# Patient Record
Sex: FEMALE | Race: OTHER | NOT HISPANIC OR LATINO | URBAN - METROPOLITAN AREA
[De-identification: names, ages, dates, MRNs, and addresses within clinical notes are randomized per-mention and may not be internally consistent; named-entity substitution may affect disease eponyms.]

---

## 2021-07-03 ENCOUNTER — EMERGENCY (EMERGENCY)
Facility: HOSPITAL | Age: 26
LOS: 1 days | Discharge: HOME | End: 2021-07-03
Attending: EMERGENCY MEDICINE
Payer: MEDICAID

## 2021-07-03 VITALS
OXYGEN SATURATION: 99 % | SYSTOLIC BLOOD PRESSURE: 108 MMHG | TEMPERATURE: 98 F | RESPIRATION RATE: 18 BRPM | HEART RATE: 94 BPM | DIASTOLIC BLOOD PRESSURE: 72 MMHG

## 2021-07-03 VITALS
RESPIRATION RATE: 20 BRPM | DIASTOLIC BLOOD PRESSURE: 87 MMHG | SYSTOLIC BLOOD PRESSURE: 149 MMHG | TEMPERATURE: 98 F | HEIGHT: 62 IN | HEART RATE: 118 BPM | OXYGEN SATURATION: 97 % | WEIGHT: 268.08 LBS

## 2021-07-03 LAB
ALBUMIN SERPL ELPH-MCNC: 3.3 G/DL — LOW (ref 3.5–5)
ALP SERPL-CCNC: 80 U/L — SIGNIFICANT CHANGE UP (ref 40–120)
ALT FLD-CCNC: 34 U/L DA — SIGNIFICANT CHANGE UP (ref 10–60)
ANION GAP SERPL CALC-SCNC: 9 MMOL/L — SIGNIFICANT CHANGE UP (ref 5–17)
APPEARANCE UR: CLEAR — SIGNIFICANT CHANGE UP
APTT BLD: 32.8 SEC — SIGNIFICANT CHANGE UP (ref 27.5–35.5)
AST SERPL-CCNC: 26 U/L — SIGNIFICANT CHANGE UP (ref 10–40)
BACTERIA # UR AUTO: ABNORMAL /HPF
BASOPHILS # BLD AUTO: 0.05 K/UL — SIGNIFICANT CHANGE UP (ref 0–0.2)
BASOPHILS NFR BLD AUTO: 0.5 % — SIGNIFICANT CHANGE UP (ref 0–2)
BILIRUB SERPL-MCNC: 0.3 MG/DL — SIGNIFICANT CHANGE UP (ref 0.2–1.2)
BILIRUB UR-MCNC: NEGATIVE — SIGNIFICANT CHANGE UP
BLD GP AB SCN SERPL QL: SIGNIFICANT CHANGE UP
BUN SERPL-MCNC: 8 MG/DL — SIGNIFICANT CHANGE UP (ref 7–18)
CALCIUM SERPL-MCNC: 8.3 MG/DL — LOW (ref 8.4–10.5)
CHLORIDE SERPL-SCNC: 106 MMOL/L — SIGNIFICANT CHANGE UP (ref 96–108)
CO2 SERPL-SCNC: 24 MMOL/L — SIGNIFICANT CHANGE UP (ref 22–31)
COLOR SPEC: YELLOW — SIGNIFICANT CHANGE UP
COMMENT - URINE: SIGNIFICANT CHANGE UP
CREAT SERPL-MCNC: 0.47 MG/DL — LOW (ref 0.5–1.3)
DIFF PNL FLD: ABNORMAL
EOSINOPHIL # BLD AUTO: 0.17 K/UL — SIGNIFICANT CHANGE UP (ref 0–0.5)
EOSINOPHIL NFR BLD AUTO: 1.8 % — SIGNIFICANT CHANGE UP (ref 0–6)
EPI CELLS # UR: ABNORMAL /HPF
GLUCOSE SERPL-MCNC: 98 MG/DL — SIGNIFICANT CHANGE UP (ref 70–99)
GLUCOSE UR QL: NEGATIVE — SIGNIFICANT CHANGE UP
HCG SERPL-ACNC: 4613 MIU/ML — HIGH
HCT VFR BLD CALC: 37.2 % — SIGNIFICANT CHANGE UP (ref 34.5–45)
HGB BLD-MCNC: 12.3 G/DL — SIGNIFICANT CHANGE UP (ref 11.5–15.5)
IMM GRANULOCYTES NFR BLD AUTO: 0.4 % — SIGNIFICANT CHANGE UP (ref 0–1.5)
INR BLD: 1.02 RATIO — SIGNIFICANT CHANGE UP (ref 0.88–1.16)
KETONES UR-MCNC: NEGATIVE — SIGNIFICANT CHANGE UP
LEUKOCYTE ESTERASE UR-ACNC: NEGATIVE — SIGNIFICANT CHANGE UP
LYMPHOCYTES # BLD AUTO: 2.91 K/UL — SIGNIFICANT CHANGE UP (ref 1–3.3)
LYMPHOCYTES # BLD AUTO: 31.4 % — SIGNIFICANT CHANGE UP (ref 13–44)
MCHC RBC-ENTMCNC: 29.4 PG — SIGNIFICANT CHANGE UP (ref 27–34)
MCHC RBC-ENTMCNC: 33.1 GM/DL — SIGNIFICANT CHANGE UP (ref 32–36)
MCV RBC AUTO: 89 FL — SIGNIFICANT CHANGE UP (ref 80–100)
MONOCYTES # BLD AUTO: 1.03 K/UL — HIGH (ref 0–0.9)
MONOCYTES NFR BLD AUTO: 11.1 % — SIGNIFICANT CHANGE UP (ref 2–14)
NEUTROPHILS # BLD AUTO: 5.08 K/UL — SIGNIFICANT CHANGE UP (ref 1.8–7.4)
NEUTROPHILS NFR BLD AUTO: 54.8 % — SIGNIFICANT CHANGE UP (ref 43–77)
NITRITE UR-MCNC: NEGATIVE — SIGNIFICANT CHANGE UP
NRBC # BLD: 0 /100 WBCS — SIGNIFICANT CHANGE UP (ref 0–0)
PH UR: 5 — SIGNIFICANT CHANGE UP (ref 5–8)
PLATELET # BLD AUTO: 340 K/UL — SIGNIFICANT CHANGE UP (ref 150–400)
POTASSIUM SERPL-MCNC: 4.6 MMOL/L — SIGNIFICANT CHANGE UP (ref 3.5–5.3)
POTASSIUM SERPL-SCNC: 4.6 MMOL/L — SIGNIFICANT CHANGE UP (ref 3.5–5.3)
PROT SERPL-MCNC: 7.3 G/DL — SIGNIFICANT CHANGE UP (ref 6–8.3)
PROT UR-MCNC: 15
PROTHROM AB SERPL-ACNC: 12.1 SEC — SIGNIFICANT CHANGE UP (ref 10.6–13.6)
RBC # BLD: 4.18 M/UL — SIGNIFICANT CHANGE UP (ref 3.8–5.2)
RBC # FLD: 12.7 % — SIGNIFICANT CHANGE UP (ref 10.3–14.5)
RBC CASTS # UR COMP ASSIST: ABNORMAL /HPF (ref 0–2)
SODIUM SERPL-SCNC: 139 MMOL/L — SIGNIFICANT CHANGE UP (ref 135–145)
SP GR SPEC: 1.02 — SIGNIFICANT CHANGE UP (ref 1.01–1.02)
UROBILINOGEN FLD QL: NEGATIVE — SIGNIFICANT CHANGE UP
WBC # BLD: 9.28 K/UL — SIGNIFICANT CHANGE UP (ref 3.8–10.5)
WBC # FLD AUTO: 9.28 K/UL — SIGNIFICANT CHANGE UP (ref 3.8–10.5)
WBC UR QL: SIGNIFICANT CHANGE UP /HPF (ref 0–5)

## 2021-07-03 PROCEDURE — 86900 BLOOD TYPING SEROLOGIC ABO: CPT

## 2021-07-03 PROCEDURE — 76802 OB US < 14 WKS ADDL FETUS: CPT

## 2021-07-03 PROCEDURE — 86850 RBC ANTIBODY SCREEN: CPT

## 2021-07-03 PROCEDURE — 84702 CHORIONIC GONADOTROPIN TEST: CPT

## 2021-07-03 PROCEDURE — 85025 COMPLETE CBC W/AUTO DIFF WBC: CPT

## 2021-07-03 PROCEDURE — 85610 PROTHROMBIN TIME: CPT

## 2021-07-03 PROCEDURE — 86901 BLOOD TYPING SEROLOGIC RH(D): CPT

## 2021-07-03 PROCEDURE — 99285 EMERGENCY DEPT VISIT HI MDM: CPT

## 2021-07-03 PROCEDURE — 81001 URINALYSIS AUTO W/SCOPE: CPT

## 2021-07-03 PROCEDURE — 76815 OB US LIMITED FETUS(S): CPT | Mod: 26

## 2021-07-03 PROCEDURE — 80053 COMPREHEN METABOLIC PANEL: CPT

## 2021-07-03 PROCEDURE — 76817 TRANSVAGINAL US OBSTETRIC: CPT | Mod: 26

## 2021-07-03 PROCEDURE — 85730 THROMBOPLASTIN TIME PARTIAL: CPT

## 2021-07-03 PROCEDURE — 36415 COLL VENOUS BLD VENIPUNCTURE: CPT

## 2021-07-03 PROCEDURE — 76817 TRANSVAGINAL US OBSTETRIC: CPT

## 2021-07-03 PROCEDURE — 96372 THER/PROPH/DIAG INJ SC/IM: CPT

## 2021-07-03 PROCEDURE — 99284 EMERGENCY DEPT VISIT MOD MDM: CPT | Mod: 25

## 2021-07-03 RX ORDER — SODIUM CHLORIDE 9 MG/ML
3 INJECTION INTRAMUSCULAR; INTRAVENOUS; SUBCUTANEOUS EVERY 8 HOURS
Refills: 0 | Status: DISCONTINUED | OUTPATIENT
Start: 2021-07-03 | End: 2021-07-06

## 2021-07-03 RX ORDER — SODIUM CHLORIDE 9 MG/ML
1000 INJECTION INTRAMUSCULAR; INTRAVENOUS; SUBCUTANEOUS ONCE
Refills: 0 | Status: COMPLETED | OUTPATIENT
Start: 2021-07-03 | End: 2021-07-03

## 2021-07-03 RX ADMIN — SODIUM CHLORIDE 1000 MILLILITER(S): 9 INJECTION INTRAMUSCULAR; INTRAVENOUS; SUBCUTANEOUS at 05:56

## 2021-07-03 RX ADMIN — SODIUM CHLORIDE 3 MILLILITER(S): 9 INJECTION INTRAMUSCULAR; INTRAVENOUS; SUBCUTANEOUS at 05:56

## 2021-07-03 NOTE — ED ADULT NURSE NOTE - NS ED NURSE DC INFO COMPLEXITY
Other Countries/No
Simple: Patient demonstrates quick and easy understanding/Verbalized Understanding

## 2021-07-03 NOTE — ED PROVIDER NOTE - CLINICAL SUMMARY MEDICAL DECISION MAKING FREE TEXT BOX
Pt p/w vaginal spotting at 5 weeks GA by LMP with no other associated symptoms. Tachycardia and otw unremarkable exam. Pt prefers U/S to pelvic exam to assess for spontaneous  vs ectopic. Giving fluids, pt refusing pain meds. Labs and U/S pending. Pt stable. Will reassess. Pt p/w vaginal spotting at 5 weeks GA by LMP with no other associated symptoms. Tachycardia and otw unremarkable exam. Pt prefers U/S to pelvic exam to assess for spontaneous  vs ectopic. Giving fluids, pt refusing pain meds. Labs and U/S pending. Pt stable. Will reassess.    Labs showing HCG 4600 and Rh neg otw unremarkable. U/S showing likely IUP though no YS visualized. Pt must get repeat HCG and U/S in 48 hours.     Rh neg to be confirmed by blood bank. Plan to s/o to incoming doc with pending UA and Rhogam.

## 2021-07-03 NOTE — ED ADULT TRIAGE NOTE - CHIEF COMPLAINT QUOTE
pt stated she is 5 weeks pregnant , went to the bathroom 20 mins ago noticed blood in her urine, compliant of mild pelvic cramps.

## 2021-07-03 NOTE — ED PROVIDER NOTE - OBJECTIVE STATEMENT
25 yo F  (1 miscarriage, no ectopics), 5 weeks preg per LMP p/w vaginal spotting just PTA to the ED. Pt states that she went to bed feeling well and then woke up to urinate and noticed blood in the toilet bowl and blood on the tissue with wiping. Pt denies any other associated symptoms. Pt states that for several weeks she's had bilat lower ABD discomfort that has not worsened recently. No ABD surgeries and no recent UTIs. Pt denies recent fever or infectious symptoms/ N/V, dysuria or frequency/hesitancy, chest pain, SOB, focal numbness/weakness, syncope, other recent illness or hospitalizations.

## 2021-07-03 NOTE — ED PROVIDER NOTE - NS ED ROS FT
Patient Reports No  Fever/Chills  Nausea/Vomiting  Urinary Symptoms  Chest Pain, Shortness of Breath  Syncope, Focal Numbness or Weakness  Other Recent Illness or Hospitalizations

## 2021-07-03 NOTE — ED PROVIDER NOTE - NSFOLLOWUPINSTRUCTIONS_ED_ALL_ED_FT
You were seen in the emergency room today for vaginal bleeding in pregnancy.    You must have repeat tests (Quantitative HCG and Pelvic Ultrasound) in 48 hours to confirm that the pregnancy is not ectopic and that it is progressing. Please return to the ER to have these tests if you cannot see an OB/GYN.    Please call your primary doctor to inform them of this ER visit and obtain the next available appointment within the next 5 days. As we discussed, return to the ER if you have any worsening symptoms.    We no longer feel that you need further emergency care or admission to the hospital at this time.    While we have determined that you are currently stable for discharge, we know that things can change. Please seek immediate medical attention or return to the ER if you experience any of the following:  Any worsening or persistent symptoms  Severe Pain  Chest Pain  Difficulty Breathing  Bleeding  Passing Out  Severe Rash  Inability to Eat or Drink  Persistent Fever    Please see a primary care doctor or specialist within 5 days to ensure that you are improving.    Please call the [facility] phone numbers on this document if you have any problems obtaining a follow up appointment.    I wish you well! -Dr Villa

## 2021-07-03 NOTE — ED PROVIDER NOTE - PATIENT PORTAL LINK FT
You can access the FollowMyHealth Patient Portal offered by [facility] by registering at the following website: [email or web address]. By joining [employer]’s FollowMyHealth portal, you will also be able to view your health information using other applications (apps) compatible with our system.

## 2021-07-03 NOTE — ED PROVIDER NOTE - PHYSICAL EXAMINATION
Vital Signs Reviewed  GEN: Anxious, NAD, AAOx3  HEENT: NCAT, MMM, Neck Supple  RESP: CTAB, No rales/rhonchi/wheezing  CV: Tachycardia, S1S2, No murmurs  ABD: No TTP, Soft, ND, No masses, No CVA Tenderness  Extrem/Skin: Equal pulses bilat, No cyanosis/edema/rashes  Neuro: No focal deficits

## 2021-07-05 ENCOUNTER — EMERGENCY (EMERGENCY)
Facility: HOSPITAL | Age: 26
LOS: 1 days | Discharge: HOME | End: 2021-07-05
Attending: EMERGENCY MEDICINE
Payer: MEDICAID

## 2021-07-05 VITALS
WEIGHT: 268.08 LBS | HEIGHT: 62 IN | HEART RATE: 101 BPM | RESPIRATION RATE: 18 BRPM | SYSTOLIC BLOOD PRESSURE: 156 MMHG | TEMPERATURE: 98 F | OXYGEN SATURATION: 98 % | DIASTOLIC BLOOD PRESSURE: 80 MMHG

## 2021-07-05 LAB — HCG SERPL-ACNC: 8115 MIU/ML — HIGH

## 2021-07-05 PROCEDURE — 36415 COLL VENOUS BLD VENIPUNCTURE: CPT

## 2021-07-05 PROCEDURE — 99283 EMERGENCY DEPT VISIT LOW MDM: CPT

## 2021-07-05 PROCEDURE — 99284 EMERGENCY DEPT VISIT MOD MDM: CPT

## 2021-07-05 PROCEDURE — 84702 CHORIONIC GONADOTROPIN TEST: CPT

## 2021-07-05 NOTE — ED PROVIDER NOTE - CLINICAL SUMMARY MEDICAL DECISION MAKING FREE TEXT BOX
No pain or dizziness or bleeding. HCG No pain or dizziness or bleeding. HCG doubled. D/w Jennifer of OB/GYN, states appropriate and c/w IUP and recommends outpt f/u. Patient is well appearing, NAD, afebrile, hemodynamically stable, asymptomatic. Any available tests and studies were discussed with patient and partner. Discharged with instructions in further symptomatic care, return precautions, and need for OB f/u.

## 2021-07-05 NOTE — ED PROVIDER NOTE - PHYSICAL EXAMINATION
Afebrile, hemodynamically stable, saturating well  NAD, well appearing, sitting comfortably in bed  Head NCAT  EOMI grossly, anicteric  MMM  Breathing comfortably on RA  Abd soft, NT, ND, nml BS, no rebound or guarding  AAO, CN's 3-12 grossly intact  TONY spontaneously, no leg cyanosis or edema  Skin warm, well perfused, no rashes or hives

## 2021-07-05 NOTE — ED PROVIDER NOTE - PATIENT PORTAL LINK FT
You can access the FollowMyHealth Patient Portal offered by [facility] by registering at the following website: [email or web address]. By joining [employer]’s FollowMyHealth portal, you will also be able to view your health information using other applications (apps) compatible with our system.

## 2021-07-05 NOTE — ED ADULT NURSE NOTE - OBJECTIVE STATEMENT
Pt AOx4, ambulatory, 5 weeks pregnant, returning for repeat blood work for HCG levels. Denies pain, hematuria, discharge.

## 2021-07-05 NOTE — ED PROVIDER NOTE - NSFOLLOWUPCLINICS_GEN_ALL_ED_FT
[facility]  [address]  [address] [place] [address]  Phone: [phone]  Fax: [phone]  Follow Up Time: 1-3 Days

## 2021-07-05 NOTE — ED PROVIDER NOTE - OBJECTIVE STATEMENT
26yoF G2A1 at 5 wks gestations presents for rpt B-HCG test. Was here 2 days ago for vaginal spotting, got Rhogam, and no more bleeding since. Reports mild lower abdominal cramping throughout pregnancy, unchanged, and no actual pain. Denies all other symptoms including v/d, dizziness, vaginal discharge, fever.

## 2021-07-05 NOTE — ED PROVIDER NOTE - NSFOLLOWUPINSTRUCTIONS_ED_ALL_ED_FT
Please follow up with your OB/GYN doctor in 1-2 days.  Please return to the emergency department if you have more pain or bleeding, dizziness, vomiting, fever, or any other symptoms.      Vaginal Bleeding During Pregnancy, First Trimester       A small amount of bleeding (spotting) from the vagina is common during early pregnancy. Sometimes the bleeding is normal and does not cause problems. At other times, though, bleeding may be a sign of something serious. Tell your doctor about any bleeding from your vagina right away.    Follow these instructions at home:    Activity   •Follow your doctor's instructions about how active you can be.  •If needed, make plans for someone to help with your normal activities.  • Do not have sex or orgasms until your doctor says that this is safe.    General instructions   •Take over-the-counter and prescription medicines only as told by your doctor.  •Watch your condition for any changes.  •Write down:  •The number of pads you use each day.  •How often you change pads.  •How soaked (saturated) your pads are.  • Do not use tampons.  • Do not douche.  •If you pass any tissue from your vagina, save it to show to your doctor.  •Keep all follow-up visits as told by your doctor. This is important.    Contact a doctor if:  •You have vaginal bleeding at any time while you are pregnant.  •You have cramps.  •You have a fever.    Get help right away if:  •You have very bad cramps in your back or belly (abdomen).  •You pass large clots or a lot of tissue from your vagina.  •Your bleeding gets worse.  •You feel light-headed.  •You feel weak.  •You pass out (faint).  •You have chills.  •You are leaking fluid from your vagina.  •You have a gush of fluid from your vagina.    Summary    •Sometimes vaginal bleeding during pregnancy is normal and does not cause problems. At other times, bleeding may be a sign of something serious.  •Tell your doctor about any bleeding from your vagina right away.  •Follow your doctor's instructions about how active you can be. You may need someone to help you with your normal activities.    This information is not intended to replace advice given to you by your health care provider. Make sure you discuss any questions you have with your health care provider.

## 2021-07-19 ENCOUNTER — EMERGENCY (EMERGENCY)
Facility: HOSPITAL | Age: 26
LOS: 1 days | Discharge: HOME | End: 2021-07-19
Attending: EMERGENCY MEDICINE
Payer: MEDICAID

## 2021-07-19 VITALS
SYSTOLIC BLOOD PRESSURE: 149 MMHG | HEIGHT: 62 IN | WEIGHT: 259.93 LBS | OXYGEN SATURATION: 98 % | TEMPERATURE: 98 F | DIASTOLIC BLOOD PRESSURE: 98 MMHG | HEART RATE: 94 BPM | RESPIRATION RATE: 16 BRPM

## 2021-07-19 PROCEDURE — 99283 EMERGENCY DEPT VISIT LOW MDM: CPT

## 2021-07-19 RX ORDER — NYSTATIN CREAM 100000 [USP'U]/G
1 CREAM TOPICAL
Qty: 1 | Refills: 0
Start: 2021-07-19 | End: 2021-07-25

## 2021-07-19 RX ORDER — CEPHALEXIN 500 MG
1 CAPSULE ORAL
Qty: 28 | Refills: 0
Start: 2021-07-19 | End: 2021-07-25

## 2021-07-19 NOTE — ED PROVIDER NOTE - PATIENT PORTAL LINK FT
You can access the FollowMyHealth Patient Portal offered by [facility] by registering at the following website: [email or web address]. By joining [employer]’s FollowMyHealth portal, you will also be able to view your health information using other applications (apps) compatible with our system.

## 2021-07-19 NOTE — ED PROVIDER NOTE - ATTENDING CONTRIBUTION TO CARE
seen with acp  was using a depilatory in the groin area  developed redness and streaking  Imp cellulitis  will start on keflex and nystatin cream  agree with acps assessment hx and physical and disposition

## 2021-07-19 NOTE — ED PROVIDER NOTE - PHYSICAL EXAMINATION
GEN:   comfortable, in no apparent distress, AOx3  EYES:   PERRL, extra-occular movements intact  HEENT:   airway patent, moist mucosal membranes, uvula midline  CV:   regular rate and rhythm, normal S1/S2, no murmur  RESP:   clear to auscultation bilaterally, non-labored, speaking in full sentences  ABD:   soft, non tender, no guarding  :   no cva tenderness  MSK:   no musculoskeletal tenderness, 5/5 strength, moving all extremities  SKIN:  Chaperoned by Gena PARK. Cellulitis red rash to the suprapubic and area under the panis. NO streaking, increased warmth, no blistering or crepitus, Same as the right groin region, no rash to the genitalia   NEURO:   AOx3, no focal weakness or loss of sensation, gait normal, GCS 15  PSYCH: calm, cooperative, no apparent risk to self and others

## 2021-07-19 NOTE — ED PROVIDER NOTE - OBJECTIVE STATEMENT
25 y/o F patient with no significant PMHx presents to the ED with c/o cellulitis to the groin area. Patient reports she shaved the groin are and used nares for hair removal 2days ago. Patient states then noticing redness to the suprapubic area below panis and right groin region. No fever and chills, or any other complaints. 27 y/o F patient with no significant PMHx presents to the ED with c/o cellulitis to the groin area. Patient reports she shaved the groin are and used nares for hair removal 2days ago. Patient states then noticing redness to the suprapubic area below panus and right groin region. No fever and chills, or any other complaints.

## 2021-07-19 NOTE — ED PROVIDER NOTE - CLINICAL SUMMARY MEDICAL DECISION MAKING FREE TEXT BOX
27 y/o F patient presents to the ED with c/o cellulitis as described above. Will treat as a fungal and bacterial infection. 27 y/o F patient presents to the ED with c/o cellulitis as described above. Will treat as a fungal and bacterial infection.  No evidence of holly's gangrene.

## 2021-07-19 NOTE — ED PROVIDER NOTE - NS ED ROS FT
ROS  GENERAL: no fevers, no chills  EYES: no visual changes, no blurry vision    ENT: no epistaxis,  no throat pain  CHEST: no pain with breathing,  no hemoptysis   CARDIAC: no chest pain, no upper back pain   GI: no abdominal pain, no hematemesis, no bright red blood per rectum   : no dysuria, no hematuria   MSK: no arm pain, no leg pain, no back pain   SKIN: +cellulitis, no rash   NEURO: no headache, no neck pain   HEME: no easy bruising, no easy bleeding

## 2021-07-20 RX ORDER — NYSTATIN CREAM 100000 [USP'U]/G
1 CREAM TOPICAL
Qty: 30 | Refills: 0
Start: 2021-07-20 | End: 2021-07-26

## 2021-07-21 ENCOUNTER — NON-APPOINTMENT (OUTPATIENT)
Age: 26
End: 2021-07-21

## 2021-07-21 ENCOUNTER — APPOINTMENT (OUTPATIENT)
Dept: OBGYN | Facility: CLINIC | Age: 26
End: 2021-07-21
Payer: MEDICAID

## 2021-07-21 ENCOUNTER — OUTPATIENT (OUTPATIENT)
Dept: OUTPATIENT SERVICES | Facility: HOSPITAL | Age: 26
LOS: 1 days | End: 2021-07-21
Payer: MEDICAID

## 2021-07-21 ENCOUNTER — LABORATORY RESULT (OUTPATIENT)
Age: 26
End: 2021-07-21

## 2021-07-21 VITALS
OXYGEN SATURATION: 98 % | DIASTOLIC BLOOD PRESSURE: 85 MMHG | WEIGHT: 267 LBS | HEART RATE: 103 BPM | TEMPERATURE: 97.5 F | BODY MASS INDEX: 47.31 KG/M2 | SYSTOLIC BLOOD PRESSURE: 143 MMHG | HEIGHT: 63 IN

## 2021-07-21 VITALS — DIASTOLIC BLOOD PRESSURE: 82 MMHG | SYSTOLIC BLOOD PRESSURE: 128 MMHG

## 2021-07-21 DIAGNOSIS — Z34.00 ENCOUNTER FOR SUPERVISION OF NORMAL FIRST PREGNANCY, UNSPECIFIED TRIMESTER: ICD-10-CM

## 2021-07-21 PROCEDURE — 36415 COLL VENOUS BLD VENIPUNCTURE: CPT | Mod: NC

## 2021-07-21 PROCEDURE — 99204 OFFICE O/P NEW MOD 45 MIN: CPT | Mod: 25

## 2021-07-21 RX ORDER — NYSTATIN 100000 U/G
100000 OINTMENT TOPICAL
Refills: 0 | Status: COMPLETED | COMMUNITY

## 2021-07-21 RX ORDER — CEPHALEXIN 500 MG
500 CAPSULE ORAL
Refills: 0 | Status: COMPLETED | COMMUNITY

## 2021-07-22 ENCOUNTER — OUTPATIENT (OUTPATIENT)
Dept: OUTPATIENT SERVICES | Facility: HOSPITAL | Age: 26
LOS: 1 days | End: 2021-07-22
Payer: MEDICAID

## 2021-07-22 ENCOUNTER — APPOINTMENT (OUTPATIENT)
Dept: DERMATOLOGY | Facility: HOSPITAL | Age: 26
End: 2021-07-22
Payer: MEDICAID

## 2021-07-22 VITALS
DIASTOLIC BLOOD PRESSURE: 98 MMHG | BODY MASS INDEX: 47.31 KG/M2 | HEART RATE: 106 BPM | HEIGHT: 63 IN | TEMPERATURE: 96.7 F | WEIGHT: 267 LBS | SYSTOLIC BLOOD PRESSURE: 135 MMHG

## 2021-07-22 DIAGNOSIS — Z3A.01 LESS THAN 8 WEEKS GESTATION OF PREGNANCY: ICD-10-CM

## 2021-07-22 DIAGNOSIS — Z34.90 ENCOUNTER FOR SUPERVISION OF NORMAL PREGNANCY, UNSPECIFIED, UNSPECIFIED TRIMESTER: ICD-10-CM

## 2021-07-22 DIAGNOSIS — Z11.3 ENCOUNTER FOR SCREENING FOR INFECTIONS WITH A PREDOMINANTLY SEXUAL MODE OF TRANSMISSION: ICD-10-CM

## 2021-07-22 DIAGNOSIS — L30.9 DERMATITIS, UNSPECIFIED: ICD-10-CM

## 2021-07-22 DIAGNOSIS — Z01.419 ENCOUNTER FOR GYNECOLOGICAL EXAMINATION (GENERAL) (ROUTINE) WITHOUT ABNORMAL FINDINGS: ICD-10-CM

## 2021-07-22 DIAGNOSIS — E66.01 MORBID (SEVERE) OBESITY DUE TO EXCESS CALORIES: ICD-10-CM

## 2021-07-22 DIAGNOSIS — L03.314 CELLULITIS OF GROIN: ICD-10-CM

## 2021-07-22 DIAGNOSIS — L27.0 GENERALIZED SKIN ERUPTION DUE TO DRUGS AND MEDICAMENTS TAKEN INTERNALLY: ICD-10-CM

## 2021-07-22 DIAGNOSIS — L98.9 DISORDER OF THE SKIN AND SUBCUTANEOUS TISSUE, UNSPECIFIED: ICD-10-CM

## 2021-07-22 PROCEDURE — 81003 URINALYSIS AUTO W/O SCOPE: CPT

## 2021-07-22 PROCEDURE — G0463: CPT

## 2021-07-22 PROCEDURE — 86900 BLOOD TYPING SEROLOGIC ABO: CPT

## 2021-07-22 PROCEDURE — 84550 ASSAY OF BLOOD/URIC ACID: CPT

## 2021-07-22 PROCEDURE — 84443 ASSAY THYROID STIM HORMONE: CPT

## 2021-07-22 PROCEDURE — 86762 RUBELLA ANTIBODY: CPT

## 2021-07-22 PROCEDURE — 83655 ASSAY OF LEAD: CPT

## 2021-07-22 PROCEDURE — 87389 HIV-1 AG W/HIV-1&-2 AB AG IA: CPT

## 2021-07-22 PROCEDURE — 83020 HEMOGLOBIN ELECTROPHORESIS: CPT

## 2021-07-22 PROCEDURE — 99203 OFFICE O/P NEW LOW 30 MIN: CPT

## 2021-07-22 PROCEDURE — 86850 RBC ANTIBODY SCREEN: CPT

## 2021-07-22 PROCEDURE — 36415 COLL VENOUS BLD VENIPUNCTURE: CPT

## 2021-07-22 PROCEDURE — 84439 ASSAY OF FREE THYROXINE: CPT

## 2021-07-22 PROCEDURE — 86870 RBC ANTIBODY IDENTIFICATION: CPT

## 2021-07-22 PROCEDURE — 81329 SMN1 GENE DOS/DELETION ALYS: CPT

## 2021-07-22 PROCEDURE — 87624 HPV HI-RISK TYP POOLED RSLT: CPT

## 2021-07-22 PROCEDURE — 81025 URINE PREGNANCY TEST: CPT

## 2021-07-22 PROCEDURE — 86901 BLOOD TYPING SEROLOGIC RH(D): CPT

## 2021-07-22 PROCEDURE — 86787 VARICELLA-ZOSTER ANTIBODY: CPT

## 2021-07-22 PROCEDURE — 87086 URINE CULTURE/COLONY COUNT: CPT

## 2021-07-22 PROCEDURE — 80053 COMPREHEN METABOLIC PANEL: CPT

## 2021-07-22 PROCEDURE — 86780 TREPONEMA PALLIDUM: CPT

## 2021-07-22 PROCEDURE — 87491 CHLMYD TRACH DNA AMP PROBE: CPT

## 2021-07-22 PROCEDURE — 84156 ASSAY OF PROTEIN URINE: CPT

## 2021-07-22 PROCEDURE — 81220 CFTR GENE COM VARIANTS: CPT

## 2021-07-22 PROCEDURE — 86480 TB TEST CELL IMMUN MEASURE: CPT

## 2021-07-22 PROCEDURE — 86803 HEPATITIS C AB TEST: CPT

## 2021-07-22 PROCEDURE — 81243 FMR1 GEN ALY DETC ABNL ALLEL: CPT

## 2021-07-22 PROCEDURE — 86765 RUBEOLA ANTIBODY: CPT

## 2021-07-22 PROCEDURE — 85025 COMPLETE CBC W/AUTO DIFF WBC: CPT

## 2021-07-22 PROCEDURE — 83036 HEMOGLOBIN GLYCOSYLATED A1C: CPT

## 2021-07-22 PROCEDURE — 87591 N.GONORRHOEAE DNA AMP PROB: CPT

## 2021-07-22 PROCEDURE — 84702 CHORIONIC GONADOTROPIN TEST: CPT

## 2021-07-22 PROCEDURE — 87340 HEPATITIS B SURFACE AG IA: CPT

## 2021-07-23 LAB
ABO + RH PNL BLD: NORMAL
ALBUMIN SERPL ELPH-MCNC: 4.2 G/DL
ALP BLD-CCNC: 74 U/L
ALT SERPL-CCNC: 21 U/L
ANION GAP SERPL CALC-SCNC: 14 MMOL/L
AST SERPL-CCNC: 17 U/L
BACTERIA UR CULT: NORMAL
BASOPHILS # BLD AUTO: 0.03 K/UL
BASOPHILS NFR BLD AUTO: 0.3 %
BILIRUB SERPL-MCNC: 0.3 MG/DL
BLD GP AB SCN SERPL QL: NORMAL
BUN SERPL-MCNC: 8 MG/DL
C TRACH RRNA SPEC QL NAA+PROBE: NOT DETECTED
CALCIUM SERPL-MCNC: 9.5 MG/DL
CHLORIDE SERPL-SCNC: 102 MMOL/L
CO2 SERPL-SCNC: 18 MMOL/L
CREAT SERPL-MCNC: 0.5 MG/DL
CREAT SPEC-SCNC: 274 MG/DL
CREAT/PROT UR: 0.1 RATIO
EOSINOPHIL # BLD AUTO: 0.27 K/UL
EOSINOPHIL NFR BLD AUTO: 3 %
ESTIMATED AVERAGE GLUCOSE: 103 MG/DL
GLUCOSE SERPL-MCNC: 82 MG/DL
HBA1C MFR BLD HPLC: 5.2 %
HBV SURFACE AG SER QL: NONREACTIVE
HCG SERPL-MCNC: ABNORMAL MIU/ML
HCT VFR BLD CALC: 38.1 %
HCV AB SER QL: NONREACTIVE
HCV S/CO RATIO: 0.11 S/CO
HGB A MFR BLD: 97.3 %
HGB A2 MFR BLD: 2.7 %
HGB BLD-MCNC: 12.6 G/DL
HGB FRACT BLD-IMP: NORMAL
HIV1+2 AB SPEC QL IA.RAPID: NONREACTIVE
HPV HIGH+LOW RISK DNA PNL CVX: NOT DETECTED
IMM GRANULOCYTES NFR BLD AUTO: 0.3 %
LEAD BLD-MCNC: <1 UG/DL
LYMPHOCYTES # BLD AUTO: 1.56 K/UL
LYMPHOCYTES NFR BLD AUTO: 17.3 %
MAN DIFF?: NORMAL
MCHC RBC-ENTMCNC: 30.7 PG
MCHC RBC-ENTMCNC: 33.1 GM/DL
MCV RBC AUTO: 92.7 FL
MEV IGG FLD QL IA: 72.2 AU/ML
MEV IGG+IGM SER-IMP: POSITIVE
MONOCYTES # BLD AUTO: 0.61 K/UL
MONOCYTES NFR BLD AUTO: 6.8 %
N GONORRHOEA RRNA SPEC QL NAA+PROBE: NOT DETECTED
NEUTROPHILS # BLD AUTO: 6.51 K/UL
NEUTROPHILS NFR BLD AUTO: 72.3 %
PLATELET # BLD AUTO: 339 K/UL
POTASSIUM SERPL-SCNC: 3.9 MMOL/L
PROT SERPL-MCNC: 7.1 G/DL
PROT UR-MCNC: 26 MG/DL
RBC # BLD: 4.11 M/UL
RBC # FLD: 13.1 %
RUBV IGG FLD-ACNC: 0.8 INDEX
RUBV IGG SER-IMP: NEGATIVE
SODIUM SERPL-SCNC: 134 MMOL/L
SOURCE TP AMPLIFICATION: NORMAL
T PALLIDUM AB SER QL IA: NEGATIVE
T4 FREE SERPL-MCNC: 1 NG/DL
TSH SERPL-ACNC: 2.44 UIU/ML
URATE SERPL-MCNC: 4.7 MG/DL
VZV AB TITR SER: NEGATIVE
VZV IGG SER IF-ACNC: 41.3 INDEX
WBC # FLD AUTO: 9.01 K/UL

## 2021-07-23 RX ORDER — CEPHALEXIN 500 MG/1
500 CAPSULE ORAL 4 TIMES DAILY
Refills: 0 | Status: COMPLETED | COMMUNITY
Start: 2021-07-22

## 2021-07-23 RX ORDER — NYSTATIN 100000 [USP'U]/G
100000 CREAM TOPICAL 3 TIMES DAILY
Refills: 0 | Status: COMPLETED | COMMUNITY
Start: 2021-07-22

## 2021-07-26 ENCOUNTER — APPOINTMENT (OUTPATIENT)
Dept: ANTEPARTUM | Facility: CLINIC | Age: 26
End: 2021-07-26
Payer: MEDICAID

## 2021-07-26 ENCOUNTER — ASOB RESULT (OUTPATIENT)
Age: 26
End: 2021-07-26

## 2021-07-26 LAB
CYTOLOGY CVX/VAG DOC THIN PREP: NORMAL
M TB IFN-G BLD-IMP: NEGATIVE
QUANTIFERON TB PLUS MITOGEN MINUS NIL: 4.33 IU/ML
QUANTIFERON TB PLUS NIL: 0.04 IU/ML
QUANTIFERON TB PLUS TB1 MINUS NIL: -0.01 IU/ML
QUANTIFERON TB PLUS TB2 MINUS NIL: -0.01 IU/ML

## 2021-07-26 PROCEDURE — 76817 TRANSVAGINAL US OBSTETRIC: CPT

## 2021-07-26 PROCEDURE — 76801 OB US < 14 WKS SINGLE FETUS: CPT

## 2021-07-27 LAB — AR GENE MUT ANL BLD/T: NORMAL

## 2021-07-29 LAB — FMR1 GENE MUT ANL BLD/T: NORMAL

## 2021-07-31 ENCOUNTER — TRANSCRIPTION ENCOUNTER (OUTPATIENT)
Age: 26
End: 2021-07-31

## 2021-08-02 LAB — CFTR MUT TESTED BLD/T: NEGATIVE

## 2021-08-06 ENCOUNTER — APPOINTMENT (OUTPATIENT)
Dept: DERMATOLOGY | Facility: CLINIC | Age: 26
End: 2021-08-06

## 2021-08-16 ENCOUNTER — ASOB RESULT (OUTPATIENT)
Age: 26
End: 2021-08-16

## 2021-08-16 ENCOUNTER — APPOINTMENT (OUTPATIENT)
Dept: ANTEPARTUM | Facility: CLINIC | Age: 26
End: 2021-08-16
Payer: MEDICAID

## 2021-08-16 PROCEDURE — 76801 OB US < 14 WKS SINGLE FETUS: CPT

## 2021-08-16 PROCEDURE — 76813 OB US NUCHAL MEAS 1 GEST: CPT | Mod: 59

## 2021-08-18 ENCOUNTER — OUTPATIENT (OUTPATIENT)
Dept: OUTPATIENT SERVICES | Facility: HOSPITAL | Age: 26
LOS: 1 days | End: 2021-08-18
Payer: MEDICAID

## 2021-08-18 ENCOUNTER — APPOINTMENT (OUTPATIENT)
Dept: OBGYN | Facility: CLINIC | Age: 26
End: 2021-08-18
Payer: MEDICAID

## 2021-08-18 VITALS
TEMPERATURE: 97.9 F | BODY MASS INDEX: 47.66 KG/M2 | OXYGEN SATURATION: 98 % | HEART RATE: 108 BPM | HEIGHT: 63 IN | SYSTOLIC BLOOD PRESSURE: 148 MMHG | DIASTOLIC BLOOD PRESSURE: 91 MMHG | WEIGHT: 269 LBS

## 2021-08-18 VITALS
WEIGHT: 250 LBS | DIASTOLIC BLOOD PRESSURE: 69 MMHG | HEIGHT: 63 IN | TEMPERATURE: 98 F | BODY MASS INDEX: 44.3 KG/M2 | HEART RATE: 107 BPM | OXYGEN SATURATION: 99 % | SYSTOLIC BLOOD PRESSURE: 106 MMHG

## 2021-08-18 VITALS — DIASTOLIC BLOOD PRESSURE: 82 MMHG | SYSTOLIC BLOOD PRESSURE: 126 MMHG

## 2021-08-18 DIAGNOSIS — L27.0 GENERALIZED SKIN ERUPTION DUE TO DRUGS AND MEDICAMENTS TAKEN INTERNALLY: ICD-10-CM

## 2021-08-18 DIAGNOSIS — Z34.00 ENCOUNTER FOR SUPERVISION OF NORMAL FIRST PREGNANCY, UNSPECIFIED TRIMESTER: ICD-10-CM

## 2021-08-18 DIAGNOSIS — Z3A.01 LESS THAN 8 WEEKS GESTATION OF PREGNANCY: ICD-10-CM

## 2021-08-18 DIAGNOSIS — L03.314 CELLULITIS OF GROIN: ICD-10-CM

## 2021-08-18 DIAGNOSIS — Z34.90 ENCOUNTER FOR SUPERVISION OF NORMAL PREGNANCY, UNSPECIFIED, UNSPECIFIED TRIMESTER: ICD-10-CM

## 2021-08-18 DIAGNOSIS — Z87.2 PERSONAL HISTORY OF DISEASES OF THE SKIN AND SUBCUTANEOUS TISSUE: ICD-10-CM

## 2021-08-18 PROCEDURE — G0463: CPT

## 2021-08-18 PROCEDURE — 80053 COMPREHEN METABOLIC PANEL: CPT

## 2021-08-18 PROCEDURE — 85025 COMPLETE CBC W/AUTO DIFF WBC: CPT

## 2021-08-18 PROCEDURE — 99213 OFFICE O/P EST LOW 20 MIN: CPT

## 2021-08-18 PROCEDURE — 81003 URINALYSIS AUTO W/O SCOPE: CPT

## 2021-08-18 PROCEDURE — 82950 GLUCOSE TEST: CPT

## 2021-08-19 DIAGNOSIS — O26.899 OTHER SPECIFIED PREGNANCY RELATED CONDITIONS, UNSPECIFIED TRIMESTER: ICD-10-CM

## 2021-08-19 DIAGNOSIS — N76.1 SUBACUTE AND CHRONIC VAGINITIS: ICD-10-CM

## 2021-08-19 DIAGNOSIS — E66.01 MORBID (SEVERE) OBESITY DUE TO EXCESS CALORIES: ICD-10-CM

## 2021-08-19 DIAGNOSIS — Z34.91 ENCOUNTER FOR SUPERVISION OF NORMAL PREGNANCY, UNSPECIFIED, FIRST TRIMESTER: ICD-10-CM

## 2021-08-19 LAB
ALBUMIN SERPL ELPH-MCNC: 3.9 G/DL
ALP BLD-CCNC: 64 U/L
ALT SERPL-CCNC: 21 U/L
ANION GAP SERPL CALC-SCNC: 12 MMOL/L
AST SERPL-CCNC: 12 U/L
BASOPHILS # BLD AUTO: 0.04 K/UL
BASOPHILS NFR BLD AUTO: 0.5 %
BILIRUB SERPL-MCNC: 0.4 MG/DL
BUN SERPL-MCNC: 6 MG/DL
CALCIUM SERPL-MCNC: 9.3 MG/DL
CHLORIDE SERPL-SCNC: 102 MMOL/L
CO2 SERPL-SCNC: 20 MMOL/L
CREAT SERPL-MCNC: 0.41 MG/DL
EOSINOPHIL # BLD AUTO: 0.33 K/UL
EOSINOPHIL NFR BLD AUTO: 3.9 %
GLUCOSE 1H P 50 G GLC PO SERPL-MCNC: 113 MG/DL
GLUCOSE SERPL-MCNC: 119 MG/DL
HCT VFR BLD CALC: 37.6 %
HGB BLD-MCNC: 12.3 G/DL
IMM GRANULOCYTES NFR BLD AUTO: 0.5 %
LYMPHOCYTES # BLD AUTO: 2 K/UL
LYMPHOCYTES NFR BLD AUTO: 23.5 %
MAN DIFF?: NORMAL
MCHC RBC-ENTMCNC: 29.9 PG
MCHC RBC-ENTMCNC: 32.7 GM/DL
MCV RBC AUTO: 91.3 FL
MONOCYTES # BLD AUTO: 0.55 K/UL
MONOCYTES NFR BLD AUTO: 6.5 %
NEUTROPHILS # BLD AUTO: 5.56 K/UL
NEUTROPHILS NFR BLD AUTO: 65.1 %
PLATELET # BLD AUTO: 357 K/UL
POTASSIUM SERPL-SCNC: 3.8 MMOL/L
PROT SERPL-MCNC: 6.6 G/DL
RBC # BLD: 4.12 M/UL
RBC # FLD: 13.1 %
SODIUM SERPL-SCNC: 134 MMOL/L
WBC # FLD AUTO: 8.52 K/UL

## 2021-08-20 ENCOUNTER — APPOINTMENT (OUTPATIENT)
Dept: DERMATOLOGY | Facility: CLINIC | Age: 26
End: 2021-08-20

## 2021-09-14 ENCOUNTER — APPOINTMENT (OUTPATIENT)
Dept: ANTEPARTUM | Facility: CLINIC | Age: 26
End: 2021-09-14
Payer: MEDICAID

## 2021-09-14 ENCOUNTER — ASOB RESULT (OUTPATIENT)
Age: 26
End: 2021-09-14

## 2021-09-14 PROCEDURE — 76805 OB US >/= 14 WKS SNGL FETUS: CPT

## 2021-09-15 ENCOUNTER — OUTPATIENT (OUTPATIENT)
Dept: OUTPATIENT SERVICES | Facility: HOSPITAL | Age: 26
LOS: 1 days | End: 2021-09-15
Payer: MEDICAID

## 2021-09-15 ENCOUNTER — APPOINTMENT (OUTPATIENT)
Dept: OBGYN | Facility: CLINIC | Age: 26
End: 2021-09-15
Payer: MEDICAID

## 2021-09-15 VITALS
SYSTOLIC BLOOD PRESSURE: 124 MMHG | HEART RATE: 89 BPM | WEIGHT: 266 LBS | TEMPERATURE: 97.5 F | BODY MASS INDEX: 47.13 KG/M2 | HEIGHT: 63 IN | DIASTOLIC BLOOD PRESSURE: 83 MMHG | OXYGEN SATURATION: 99 %

## 2021-09-15 DIAGNOSIS — Z34.91 ENCOUNTER FOR SUPERVISION OF NORMAL PREGNANCY, UNSPECIFIED, FIRST TRIMESTER: ICD-10-CM

## 2021-09-15 DIAGNOSIS — Z34.00 ENCOUNTER FOR SUPERVISION OF NORMAL FIRST PREGNANCY, UNSPECIFIED TRIMESTER: ICD-10-CM

## 2021-09-15 PROCEDURE — G0463: CPT

## 2021-09-15 PROCEDURE — 99213 OFFICE O/P EST LOW 20 MIN: CPT

## 2021-09-15 PROCEDURE — 81003 URINALYSIS AUTO W/O SCOPE: CPT

## 2021-09-15 PROCEDURE — 87086 URINE CULTURE/COLONY COUNT: CPT

## 2021-09-15 RX ORDER — METRONIDAZOLE 7.5 MG/G
0.75 GEL VAGINAL
Qty: 1 | Refills: 0 | Status: COMPLETED | COMMUNITY
Start: 2021-08-18 | End: 2021-09-15

## 2021-09-17 DIAGNOSIS — E66.01 MORBID (SEVERE) OBESITY DUE TO EXCESS CALORIES: ICD-10-CM

## 2021-09-17 DIAGNOSIS — Z34.92 ENCOUNTER FOR SUPERVISION OF NORMAL PREGNANCY, UNSPECIFIED, SECOND TRIMESTER: ICD-10-CM

## 2021-09-17 DIAGNOSIS — Z3A.15 15 WEEKS GESTATION OF PREGNANCY: ICD-10-CM

## 2021-09-17 LAB — BACTERIA UR CULT: NORMAL

## 2021-10-01 NOTE — ED ADULT TRIAGE NOTE - MEANS OF ARRIVAL
PATIENT'S SISTER (NISREEN) IS RETURNING DR RUBIO'S MESSAGE. GAVE APPROVAL FOR DR RUBIO TO LEAVE A DETAILED MESSAGE ON HER VOICEMAIL.    ambulatory

## 2021-10-18 ENCOUNTER — OUTPATIENT (OUTPATIENT)
Dept: OUTPATIENT SERVICES | Facility: HOSPITAL | Age: 26
LOS: 1 days | End: 2021-10-18
Payer: MEDICAID

## 2021-10-18 ENCOUNTER — ASOB RESULT (OUTPATIENT)
Age: 26
End: 2021-10-18

## 2021-10-18 ENCOUNTER — APPOINTMENT (OUTPATIENT)
Dept: OBGYN | Facility: CLINIC | Age: 26
End: 2021-10-18
Payer: MEDICAID

## 2021-10-18 ENCOUNTER — APPOINTMENT (OUTPATIENT)
Dept: ANTEPARTUM | Facility: CLINIC | Age: 26
End: 2021-10-18
Payer: MEDICAID

## 2021-10-18 VITALS
HEIGHT: 63 IN | OXYGEN SATURATION: 99 % | WEIGHT: 266 LBS | BODY MASS INDEX: 47.13 KG/M2 | SYSTOLIC BLOOD PRESSURE: 128 MMHG | HEART RATE: 112 BPM | RESPIRATION RATE: 18 BRPM | DIASTOLIC BLOOD PRESSURE: 84 MMHG

## 2021-10-18 DIAGNOSIS — Z34.00 ENCOUNTER FOR SUPERVISION OF NORMAL FIRST PREGNANCY, UNSPECIFIED TRIMESTER: ICD-10-CM

## 2021-10-18 PROCEDURE — 76811 OB US DETAILED SNGL FETUS: CPT

## 2021-10-18 PROCEDURE — 86336 INHIBIN A: CPT

## 2021-10-18 PROCEDURE — 99214 OFFICE O/P EST MOD 30 MIN: CPT | Mod: 25

## 2021-10-18 PROCEDURE — 87186 SC STD MICRODIL/AGAR DIL: CPT

## 2021-10-18 PROCEDURE — 36415 COLL VENOUS BLD VENIPUNCTURE: CPT

## 2021-10-18 PROCEDURE — 81003 URINALYSIS AUTO W/O SCOPE: CPT

## 2021-10-18 PROCEDURE — 36415 COLL VENOUS BLD VENIPUNCTURE: CPT | Mod: NC

## 2021-10-18 PROCEDURE — 87086 URINE CULTURE/COLONY COUNT: CPT

## 2021-10-18 PROCEDURE — G0463: CPT

## 2021-10-20 DIAGNOSIS — Z34.92 ENCOUNTER FOR SUPERVISION OF NORMAL PREGNANCY, UNSPECIFIED, SECOND TRIMESTER: ICD-10-CM

## 2021-10-20 DIAGNOSIS — Z3A.20 20 WEEKS GESTATION OF PREGNANCY: ICD-10-CM

## 2021-10-20 DIAGNOSIS — E66.01 MORBID (SEVERE) OBESITY DUE TO EXCESS CALORIES: ICD-10-CM

## 2021-10-21 ENCOUNTER — NON-APPOINTMENT (OUTPATIENT)
Age: 26
End: 2021-10-21

## 2021-10-23 LAB
1ST TRIMESTER DATA: NORMAL
2ND TRIMESTER DATA: NORMAL
AFP PNL SERPL: NORMAL
AFP SERPL-ACNC: NORMAL
AFP SERPL-ACNC: NORMAL
B-HCG FREE SERPL-MCNC: NORMAL
BACTERIA UR CULT: ABNORMAL
CLINICAL BIOCHEMIST REVIEW: NORMAL
FREE BETA HCG 1ST TRIMESTER: NORMAL
INHIBIN A SERPL-MCNC: NORMAL
NOTES NTD: NORMAL
NT: NORMAL
PAPP-A SERPL-ACNC: NORMAL
U ESTRIOL SERPL-SCNC: NORMAL

## 2021-10-25 ENCOUNTER — NON-APPOINTMENT (OUTPATIENT)
Age: 26
End: 2021-10-25

## 2021-10-27 ENCOUNTER — NON-APPOINTMENT (OUTPATIENT)
Age: 26
End: 2021-10-27

## 2021-11-01 ENCOUNTER — ASOB RESULT (OUTPATIENT)
Age: 26
End: 2021-11-01

## 2021-11-01 ENCOUNTER — APPOINTMENT (OUTPATIENT)
Dept: ANTEPARTUM | Facility: CLINIC | Age: 26
End: 2021-11-01
Payer: MEDICAID

## 2021-11-01 PROCEDURE — 76816 OB US FOLLOW-UP PER FETUS: CPT

## 2021-11-15 ENCOUNTER — APPOINTMENT (OUTPATIENT)
Dept: OBGYN | Facility: CLINIC | Age: 26
End: 2021-11-15
Payer: MEDICAID

## 2021-11-15 ENCOUNTER — OUTPATIENT (OUTPATIENT)
Dept: OUTPATIENT SERVICES | Facility: HOSPITAL | Age: 26
LOS: 1 days | End: 2021-11-15
Payer: MEDICAID

## 2021-11-15 VITALS
SYSTOLIC BLOOD PRESSURE: 128 MMHG | HEIGHT: 63 IN | OXYGEN SATURATION: 99 % | HEART RATE: 98 BPM | TEMPERATURE: 97.5 F | WEIGHT: 270 LBS | DIASTOLIC BLOOD PRESSURE: 80 MMHG | BODY MASS INDEX: 47.84 KG/M2 | RESPIRATION RATE: 18 BRPM

## 2021-11-15 DIAGNOSIS — Z34.00 ENCOUNTER FOR SUPERVISION OF NORMAL FIRST PREGNANCY, UNSPECIFIED TRIMESTER: ICD-10-CM

## 2021-11-15 PROCEDURE — 99213 OFFICE O/P EST LOW 20 MIN: CPT

## 2021-11-15 PROCEDURE — 36415 COLL VENOUS BLD VENIPUNCTURE: CPT

## 2021-11-15 PROCEDURE — 86780 TREPONEMA PALLIDUM: CPT

## 2021-11-15 PROCEDURE — G0463: CPT

## 2021-11-15 PROCEDURE — 81003 URINALYSIS AUTO W/O SCOPE: CPT

## 2021-11-15 PROCEDURE — 85025 COMPLETE CBC W/AUTO DIFF WBC: CPT

## 2021-11-15 PROCEDURE — 87086 URINE CULTURE/COLONY COUNT: CPT

## 2021-11-15 PROCEDURE — 82950 GLUCOSE TEST: CPT

## 2021-11-16 DIAGNOSIS — Z3A.24 24 WEEKS GESTATION OF PREGNANCY: ICD-10-CM

## 2021-11-16 DIAGNOSIS — Z34.92 ENCOUNTER FOR SUPERVISION OF NORMAL PREGNANCY, UNSPECIFIED, SECOND TRIMESTER: ICD-10-CM

## 2021-11-18 LAB
BACTERIA UR CULT: NORMAL
BASOPHILS # BLD AUTO: 0.02 K/UL
BASOPHILS NFR BLD AUTO: 0.2 %
EOSINOPHIL # BLD AUTO: 0.12 K/UL
EOSINOPHIL NFR BLD AUTO: 1.4 %
GLUCOSE 1H P 50 G GLC PO SERPL-MCNC: 149 MG/DL
HCT VFR BLD CALC: 35.6 %
HGB BLD-MCNC: 11.2 G/DL
IMM GRANULOCYTES NFR BLD AUTO: 0.5 %
LYMPHOCYTES # BLD AUTO: 1.17 K/UL
LYMPHOCYTES NFR BLD AUTO: 14 %
MAN DIFF?: NORMAL
MCHC RBC-ENTMCNC: 28.5 PG
MCHC RBC-ENTMCNC: 31.5 GM/DL
MCV RBC AUTO: 90.6 FL
MONOCYTES # BLD AUTO: 0.69 K/UL
MONOCYTES NFR BLD AUTO: 8.3 %
NEUTROPHILS # BLD AUTO: 6.32 K/UL
NEUTROPHILS NFR BLD AUTO: 75.6 %
PLATELET # BLD AUTO: 326 K/UL
RBC # BLD: 3.93 M/UL
RBC # FLD: 13.8 %
T PALLIDUM AB SER QL IA: NEGATIVE
WBC # FLD AUTO: 8.36 K/UL

## 2021-11-19 ENCOUNTER — NON-APPOINTMENT (OUTPATIENT)
Age: 26
End: 2021-11-19

## 2021-11-24 ENCOUNTER — OUTPATIENT (OUTPATIENT)
Dept: OUTPATIENT SERVICES | Facility: HOSPITAL | Age: 26
LOS: 1 days | End: 2021-11-24
Payer: MEDICAID

## 2021-11-24 DIAGNOSIS — Z34.90 ENCOUNTER FOR SUPERVISION OF NORMAL PREGNANCY, UNSPECIFIED, UNSPECIFIED TRIMESTER: ICD-10-CM

## 2021-11-24 PROCEDURE — 83036 HEMOGLOBIN GLYCOSYLATED A1C: CPT

## 2021-11-24 PROCEDURE — 82952 GTT-ADDED SAMPLES: CPT

## 2021-11-24 PROCEDURE — 82951 GLUCOSE TOLERANCE TEST (GTT): CPT

## 2021-11-29 ENCOUNTER — ASOB RESULT (OUTPATIENT)
Age: 26
End: 2021-11-29

## 2021-11-29 ENCOUNTER — APPOINTMENT (OUTPATIENT)
Dept: ANTEPARTUM | Facility: CLINIC | Age: 26
End: 2021-11-29
Payer: MEDICAID

## 2021-11-29 LAB
ESTIMATED AVERAGE GLUCOSE: 97 MG/DL
GLUCOSE 1H P 100 G GLC PO SERPL-MCNC: 120 MG/DL
GLUCOSE 2H P CHAL SERPL-MCNC: 120 MG/DL
GLUCOSE 3H P CHAL SERPL-MCNC: 114 MG/DL
GLUCOSE BS SERPL-MCNC: 72 MG/DL
HBA1C MFR BLD HPLC: 5 %

## 2021-11-29 PROCEDURE — 76816 OB US FOLLOW-UP PER FETUS: CPT

## 2021-12-06 ENCOUNTER — APPOINTMENT (OUTPATIENT)
Dept: OBGYN | Facility: CLINIC | Age: 26
End: 2021-12-06
Payer: MEDICAID

## 2021-12-06 ENCOUNTER — OUTPATIENT (OUTPATIENT)
Dept: OUTPATIENT SERVICES | Facility: HOSPITAL | Age: 26
LOS: 1 days | End: 2021-12-06
Payer: MEDICAID

## 2021-12-06 DIAGNOSIS — O23.40 UNSPECIFIED INFECTION OF URINARY TRACT IN PREGNANCY, UNSPECIFIED TRIMESTER: ICD-10-CM

## 2021-12-06 DIAGNOSIS — Z34.00 ENCOUNTER FOR SUPERVISION OF NORMAL FIRST PREGNANCY, UNSPECIFIED TRIMESTER: ICD-10-CM

## 2021-12-06 PROCEDURE — 87186 SC STD MICRODIL/AGAR DIL: CPT

## 2021-12-06 PROCEDURE — 81003 URINALYSIS AUTO W/O SCOPE: CPT

## 2021-12-06 PROCEDURE — 87086 URINE CULTURE/COLONY COUNT: CPT

## 2021-12-06 PROCEDURE — 80053 COMPREHEN METABOLIC PANEL: CPT

## 2021-12-06 PROCEDURE — G0463: CPT

## 2021-12-06 PROCEDURE — 86901 BLOOD TYPING SEROLOGIC RH(D): CPT

## 2021-12-06 PROCEDURE — 84156 ASSAY OF PROTEIN URINE: CPT

## 2021-12-06 PROCEDURE — 86900 BLOOD TYPING SEROLOGIC ABO: CPT

## 2021-12-06 PROCEDURE — 99214 OFFICE O/P EST MOD 30 MIN: CPT

## 2021-12-06 PROCEDURE — 86850 RBC ANTIBODY SCREEN: CPT

## 2021-12-06 PROCEDURE — 83036 HEMOGLOBIN GLYCOSYLATED A1C: CPT

## 2021-12-06 PROCEDURE — 85025 COMPLETE CBC W/AUTO DIFF WBC: CPT

## 2021-12-07 LAB
ABO + RH PNL BLD: NORMAL
ALBUMIN SERPL ELPH-MCNC: 3.4 G/DL
ALP BLD-CCNC: 82 U/L
ALT SERPL-CCNC: 21 U/L
ANION GAP SERPL CALC-SCNC: 14 MMOL/L
AST SERPL-CCNC: 20 U/L
BASOPHILS # BLD AUTO: 0.03 K/UL
BASOPHILS NFR BLD AUTO: 0.3 %
BILIRUB SERPL-MCNC: 0.2 MG/DL
BLD GP AB SCN SERPL QL: NORMAL
BUN SERPL-MCNC: 8 MG/DL
CALCIUM SERPL-MCNC: 9 MG/DL
CHLORIDE SERPL-SCNC: 101 MMOL/L
CO2 SERPL-SCNC: 18 MMOL/L
CREAT SERPL-MCNC: 0.47 MG/DL
CREAT SPEC-SCNC: 247 MG/DL
CREAT/PROT UR: 0.2 RATIO
EOSINOPHIL # BLD AUTO: 0.16 K/UL
EOSINOPHIL NFR BLD AUTO: 1.6 %
ESTIMATED AVERAGE GLUCOSE: 97 MG/DL
GLUCOSE SERPL-MCNC: 60 MG/DL
HBA1C MFR BLD HPLC: 5 %
HCT VFR BLD CALC: 37.9 %
HGB BLD-MCNC: 12.2 G/DL
IMM GRANULOCYTES NFR BLD AUTO: 0.2 %
LYMPHOCYTES # BLD AUTO: 1.88 K/UL
LYMPHOCYTES NFR BLD AUTO: 18.5 %
MAN DIFF?: NORMAL
MCHC RBC-ENTMCNC: 28.3 PG
MCHC RBC-ENTMCNC: 32.2 GM/DL
MCV RBC AUTO: 87.9 FL
MONOCYTES # BLD AUTO: 0.69 K/UL
MONOCYTES NFR BLD AUTO: 6.8 %
NEUTROPHILS # BLD AUTO: 7.36 K/UL
NEUTROPHILS NFR BLD AUTO: 72.6 %
PLATELET # BLD AUTO: 332 K/UL
POTASSIUM SERPL-SCNC: 4.1 MMOL/L
PROT SERPL-MCNC: 6 G/DL
PROT UR-MCNC: 36 MG/DL
RBC # BLD: 4.31 M/UL
RBC # FLD: 13.8 %
SODIUM SERPL-SCNC: 133 MMOL/L
WBC # FLD AUTO: 10.14 K/UL

## 2021-12-08 ENCOUNTER — APPOINTMENT (OUTPATIENT)
Dept: OBGYN | Facility: CLINIC | Age: 26
End: 2021-12-08

## 2021-12-10 ENCOUNTER — NON-APPOINTMENT (OUTPATIENT)
Age: 26
End: 2021-12-10

## 2021-12-10 DIAGNOSIS — O26.899 OTHER SPECIFIED PREGNANCY RELATED CONDITIONS, UNSPECIFIED TRIMESTER: ICD-10-CM

## 2021-12-10 DIAGNOSIS — E66.01 MORBID (SEVERE) OBESITY DUE TO EXCESS CALORIES: ICD-10-CM

## 2021-12-10 DIAGNOSIS — R31.9 HEMATURIA, UNSPECIFIED: ICD-10-CM

## 2021-12-10 DIAGNOSIS — Z34.92 ENCOUNTER FOR SUPERVISION OF NORMAL PREGNANCY, UNSPECIFIED, SECOND TRIMESTER: ICD-10-CM

## 2021-12-10 LAB — BACTERIA UR CULT: ABNORMAL

## 2021-12-10 RX ORDER — NITROFURANTOIN MACROCRYSTALS 100 MG/1
100 CAPSULE ORAL
Qty: 14 | Refills: 0 | Status: COMPLETED | COMMUNITY
Start: 2021-10-23 | End: 2021-12-10

## 2021-12-13 ENCOUNTER — OUTPATIENT (OUTPATIENT)
Dept: OUTPATIENT SERVICES | Facility: HOSPITAL | Age: 26
LOS: 1 days | End: 2021-12-13
Payer: MEDICAID

## 2021-12-13 ENCOUNTER — NON-APPOINTMENT (OUTPATIENT)
Age: 26
End: 2021-12-13

## 2021-12-13 ENCOUNTER — APPOINTMENT (OUTPATIENT)
Dept: OBGYN | Facility: CLINIC | Age: 26
End: 2021-12-13

## 2021-12-13 DIAGNOSIS — Z34.00 ENCOUNTER FOR SUPERVISION OF NORMAL FIRST PREGNANCY, UNSPECIFIED TRIMESTER: ICD-10-CM

## 2021-12-13 PROCEDURE — G0463: CPT

## 2021-12-13 PROCEDURE — 96372 THER/PROPH/DIAG INJ SC/IM: CPT

## 2021-12-16 DIAGNOSIS — O36.0191 MATERNAL CARE FOR ANTI-D [RH] ANTIBODIES, UNSPECIFIED TRIMESTER, FETUS 1: ICD-10-CM

## 2021-12-20 ENCOUNTER — APPOINTMENT (OUTPATIENT)
Dept: OBGYN | Facility: CLINIC | Age: 26
End: 2021-12-20
Payer: MEDICAID

## 2021-12-20 ENCOUNTER — OUTPATIENT (OUTPATIENT)
Dept: OUTPATIENT SERVICES | Facility: HOSPITAL | Age: 26
LOS: 1 days | End: 2021-12-20
Payer: MEDICAID

## 2021-12-20 VITALS — SYSTOLIC BLOOD PRESSURE: 132 MMHG | DIASTOLIC BLOOD PRESSURE: 92 MMHG

## 2021-12-20 VITALS
RESPIRATION RATE: 18 BRPM | SYSTOLIC BLOOD PRESSURE: 136 MMHG | OXYGEN SATURATION: 99 % | HEART RATE: 95 BPM | BODY MASS INDEX: 50.01 KG/M2 | HEIGHT: 63 IN | DIASTOLIC BLOOD PRESSURE: 86 MMHG | WEIGHT: 282.25 LBS | TEMPERATURE: 97 F

## 2021-12-20 DIAGNOSIS — Z34.92 ENCOUNTER FOR SUPERVISION OF NORMAL PREGNANCY, UNSPECIFIED, SECOND TRIMESTER: ICD-10-CM

## 2021-12-20 DIAGNOSIS — E66.01 MORBID (SEVERE) OBESITY DUE TO EXCESS CALORIES: ICD-10-CM

## 2021-12-20 DIAGNOSIS — Z34.00 ENCOUNTER FOR SUPERVISION OF NORMAL FIRST PREGNANCY, UNSPECIFIED TRIMESTER: ICD-10-CM

## 2021-12-20 PROCEDURE — 99215 OFFICE O/P EST HI 40 MIN: CPT

## 2021-12-20 RX ORDER — AMOXICILLIN 500 MG/1
500 CAPSULE ORAL 3 TIMES DAILY
Qty: 21 | Refills: 0 | Status: COMPLETED | COMMUNITY
Start: 2021-12-10 | End: 2021-12-20

## 2021-12-21 VITALS
HEART RATE: 97 BPM | DIASTOLIC BLOOD PRESSURE: 84 MMHG | HEIGHT: 63 IN | SYSTOLIC BLOOD PRESSURE: 143 MMHG | RESPIRATION RATE: 18 BRPM | TEMPERATURE: 98 F | OXYGEN SATURATION: 98 %

## 2021-12-21 VITALS — DIASTOLIC BLOOD PRESSURE: 88 MMHG | SYSTOLIC BLOOD PRESSURE: 134 MMHG

## 2021-12-21 DIAGNOSIS — R31.9 HEMATURIA, UNSPECIFIED: ICD-10-CM

## 2021-12-21 DIAGNOSIS — E66.01 MORBID (SEVERE) OBESITY DUE TO EXCESS CALORIES: ICD-10-CM

## 2021-12-21 DIAGNOSIS — N39.0 URINARY TRACT INFECTION, SITE NOT SPECIFIED: ICD-10-CM

## 2021-12-21 DIAGNOSIS — Z34.93 ENCOUNTER FOR SUPERVISION OF NORMAL PREGNANCY, UNSPECIFIED, THIRD TRIMESTER: ICD-10-CM

## 2021-12-21 DIAGNOSIS — Z3A.29 29 WEEKS GESTATION OF PREGNANCY: ICD-10-CM

## 2021-12-21 PROCEDURE — 84156 ASSAY OF PROTEIN URINE: CPT

## 2021-12-21 PROCEDURE — 85025 COMPLETE CBC W/AUTO DIFF WBC: CPT

## 2021-12-21 PROCEDURE — 80053 COMPREHEN METABOLIC PANEL: CPT

## 2021-12-21 PROCEDURE — 84550 ASSAY OF BLOOD/URIC ACID: CPT

## 2021-12-21 PROCEDURE — 87086 URINE CULTURE/COLONY COUNT: CPT

## 2021-12-21 PROCEDURE — 81003 URINALYSIS AUTO W/O SCOPE: CPT

## 2021-12-21 PROCEDURE — G0463: CPT

## 2021-12-22 ENCOUNTER — NON-APPOINTMENT (OUTPATIENT)
Age: 26
End: 2021-12-22

## 2021-12-22 LAB
ALBUMIN SERPL ELPH-MCNC: 3.2 G/DL
ALP BLD-CCNC: 81 U/L
ALT SERPL-CCNC: 19 U/L
ANION GAP SERPL CALC-SCNC: 10 MMOL/L
AST SERPL-CCNC: 13 U/L
BACTERIA UR CULT: ABNORMAL
BASOPHILS # BLD AUTO: 0.03 K/UL
BASOPHILS NFR BLD AUTO: 0.4 %
BILIRUB SERPL-MCNC: <0.2 MG/DL
BUN SERPL-MCNC: 11 MG/DL
CALCIUM SERPL-MCNC: 8.9 MG/DL
CHLORIDE SERPL-SCNC: 104 MMOL/L
CO2 SERPL-SCNC: 21 MMOL/L
CREAT SERPL-MCNC: 0.51 MG/DL
CREAT SPEC-SCNC: 221 MG/DL
CREAT/PROT UR: 0.6 RATIO
EOSINOPHIL # BLD AUTO: 0.12 K/UL
EOSINOPHIL NFR BLD AUTO: 1.5 %
GLUCOSE SERPL-MCNC: 89 MG/DL
HCT VFR BLD CALC: 36.1 %
HGB BLD-MCNC: 11.6 G/DL
IMM GRANULOCYTES NFR BLD AUTO: 0.3 %
LYMPHOCYTES # BLD AUTO: 1.97 K/UL
LYMPHOCYTES NFR BLD AUTO: 25.3 %
MAN DIFF?: NORMAL
MCHC RBC-ENTMCNC: 28.4 PG
MCHC RBC-ENTMCNC: 32.1 GM/DL
MCV RBC AUTO: 88.3 FL
MONOCYTES # BLD AUTO: 0.71 K/UL
MONOCYTES NFR BLD AUTO: 9.1 %
NEUTROPHILS # BLD AUTO: 4.94 K/UL
NEUTROPHILS NFR BLD AUTO: 63.4 %
PLATELET # BLD AUTO: 272 K/UL
POTASSIUM SERPL-SCNC: 4.4 MMOL/L
PROT SERPL-MCNC: 5.5 G/DL
PROT UR-MCNC: 134 MG/DL
RBC # BLD: 4.09 M/UL
RBC # FLD: 13.9 %
SODIUM SERPL-SCNC: 135 MMOL/L
URATE SERPL-MCNC: 3.9 MG/DL
WBC # FLD AUTO: 7.79 K/UL

## 2021-12-23 ENCOUNTER — INPATIENT (INPATIENT)
Facility: HOSPITAL | Age: 26
LOS: 6 days | Discharge: HOME | End: 2021-12-30
Attending: SPECIALIST | Admitting: SPECIALIST
Payer: MEDICAID

## 2021-12-23 ENCOUNTER — APPOINTMENT (OUTPATIENT)
Dept: OBGYN | Facility: CLINIC | Age: 26
End: 2021-12-23
Payer: MEDICAID

## 2021-12-23 ENCOUNTER — OUTPATIENT (OUTPATIENT)
Dept: OUTPATIENT SERVICES | Facility: HOSPITAL | Age: 26
LOS: 1 days | End: 2021-12-23
Payer: MEDICAID

## 2021-12-23 ENCOUNTER — INPATIENT (INPATIENT)
Facility: HOSPITAL | Age: 26
LOS: 0 days | Discharge: ANOTHER HOSPITAL | DRG: 305 | End: 2021-12-23
Attending: OBSTETRICS & GYNECOLOGY | Admitting: OBSTETRICS & GYNECOLOGY
Payer: MEDICAID

## 2021-12-23 VITALS — DIASTOLIC BLOOD PRESSURE: 108 MMHG | SYSTOLIC BLOOD PRESSURE: 162 MMHG

## 2021-12-23 VITALS
OXYGEN SATURATION: 100 % | BODY MASS INDEX: 50.14 KG/M2 | WEIGHT: 283 LBS | TEMPERATURE: 98.2 F | HEIGHT: 63 IN | SYSTOLIC BLOOD PRESSURE: 168 MMHG | DIASTOLIC BLOOD PRESSURE: 111 MMHG | HEART RATE: 78 BPM

## 2021-12-23 VITALS — WEIGHT: 283.07 LBS | HEIGHT: 63 IN

## 2021-12-23 VITALS — TEMPERATURE: 98 F | SYSTOLIC BLOOD PRESSURE: 140 MMHG | HEART RATE: 99 BPM | DIASTOLIC BLOOD PRESSURE: 76 MMHG

## 2021-12-23 DIAGNOSIS — O60.00 PRETERM LABOR WITHOUT DELIVERY, UNSPECIFIED TRIMESTER: ICD-10-CM

## 2021-12-23 DIAGNOSIS — Z34.00 ENCOUNTER FOR SUPERVISION OF NORMAL FIRST PREGNANCY, UNSPECIFIED TRIMESTER: ICD-10-CM

## 2021-12-23 DIAGNOSIS — O26.899 OTHER SPECIFIED PREGNANCY RELATED CONDITIONS, UNSPECIFIED TRIMESTER: ICD-10-CM

## 2021-12-23 DIAGNOSIS — Z34.80 ENCOUNTER FOR SUPERVISION OF OTHER NORMAL PREGNANCY, UNSPECIFIED TRIMESTER: ICD-10-CM

## 2021-12-23 DIAGNOSIS — Z3A.00 WEEKS OF GESTATION OF PREGNANCY NOT SPECIFIED: ICD-10-CM

## 2021-12-23 LAB
ALBUMIN SERPL ELPH-MCNC: 2.3 G/DL — LOW (ref 3.5–5)
ALBUMIN SERPL ELPH-MCNC: 3.4 G/DL — SIGNIFICANT CHANGE UP (ref 3.3–5)
ALLERGY+IMMUNOLOGY DIAG STUDY NOTE: SIGNIFICANT CHANGE UP
ALP SERPL-CCNC: 85 U/L — SIGNIFICANT CHANGE UP (ref 40–120)
ALP SERPL-CCNC: 85 U/L — SIGNIFICANT CHANGE UP (ref 40–120)
ALT FLD-CCNC: 29 U/L — SIGNIFICANT CHANGE UP (ref 4–33)
ALT FLD-CCNC: 38 U/L DA — SIGNIFICANT CHANGE UP (ref 10–60)
ANION GAP SERPL CALC-SCNC: 12 MMOL/L — SIGNIFICANT CHANGE UP (ref 7–14)
ANION GAP SERPL CALC-SCNC: 9 MMOL/L — SIGNIFICANT CHANGE UP (ref 5–17)
APPEARANCE UR: CLEAR — SIGNIFICANT CHANGE UP
APPEARANCE UR: CLEAR — SIGNIFICANT CHANGE UP
APTT BLD: 26.5 SEC — LOW (ref 27–36.3)
APTT BLD: 27 SEC — LOW (ref 27.5–35.5)
AST SERPL-CCNC: 20 U/L — SIGNIFICANT CHANGE UP (ref 4–32)
AST SERPL-CCNC: 25 U/L — SIGNIFICANT CHANGE UP (ref 10–40)
BACTERIA # UR AUTO: ABNORMAL /HPF
BASOPHILS # BLD AUTO: 0.01 K/UL — SIGNIFICANT CHANGE UP (ref 0–0.2)
BASOPHILS # BLD AUTO: 0.03 K/UL — SIGNIFICANT CHANGE UP (ref 0–0.2)
BASOPHILS NFR BLD AUTO: 0.1 % — SIGNIFICANT CHANGE UP (ref 0–2)
BASOPHILS NFR BLD AUTO: 0.3 % — SIGNIFICANT CHANGE UP (ref 0–2)
BILIRUB SERPL-MCNC: 0.3 MG/DL — SIGNIFICANT CHANGE UP (ref 0.2–1.2)
BILIRUB SERPL-MCNC: <0.2 MG/DL — SIGNIFICANT CHANGE UP (ref 0.2–1.2)
BILIRUB UR-MCNC: NEGATIVE — SIGNIFICANT CHANGE UP
BILIRUB UR-MCNC: NEGATIVE — SIGNIFICANT CHANGE UP
BLD GP AB SCN SERPL QL: POSITIVE — SIGNIFICANT CHANGE UP
BLD GP AB SCN SERPL QL: SIGNIFICANT CHANGE UP
BUN SERPL-MCNC: 11 MG/DL — SIGNIFICANT CHANGE UP (ref 7–18)
BUN SERPL-MCNC: 9 MG/DL — SIGNIFICANT CHANGE UP (ref 7–23)
CALCIUM SERPL-MCNC: 8.6 MG/DL — SIGNIFICANT CHANGE UP (ref 8.4–10.5)
CALCIUM SERPL-MCNC: 9 MG/DL — SIGNIFICANT CHANGE UP (ref 8.4–10.5)
CHLORIDE SERPL-SCNC: 103 MMOL/L — SIGNIFICANT CHANGE UP (ref 98–107)
CHLORIDE SERPL-SCNC: 106 MMOL/L — SIGNIFICANT CHANGE UP (ref 96–108)
CO2 SERPL-SCNC: 19 MMOL/L — LOW (ref 22–31)
CO2 SERPL-SCNC: 23 MMOL/L — SIGNIFICANT CHANGE UP (ref 22–31)
COLOR SPEC: SIGNIFICANT CHANGE UP
COLOR SPEC: YELLOW — SIGNIFICANT CHANGE UP
CREAT ?TM UR-MCNC: 171 MG/DL — SIGNIFICANT CHANGE UP
CREAT ?TM UR-MCNC: 84 MG/DL — SIGNIFICANT CHANGE UP
CREAT SERPL-MCNC: 0.46 MG/DL — LOW (ref 0.5–1.3)
CREAT SERPL-MCNC: 0.49 MG/DL — LOW (ref 0.5–1.3)
DIFF PNL FLD: ABNORMAL
DIFF PNL FLD: ABNORMAL
EOSINOPHIL # BLD AUTO: 0.01 K/UL — SIGNIFICANT CHANGE UP (ref 0–0.5)
EOSINOPHIL # BLD AUTO: 0.11 K/UL — SIGNIFICANT CHANGE UP (ref 0–0.5)
EOSINOPHIL NFR BLD AUTO: 0.1 % — SIGNIFICANT CHANGE UP (ref 0–6)
EOSINOPHIL NFR BLD AUTO: 1.3 % — SIGNIFICANT CHANGE UP (ref 0–6)
EPI CELLS # UR: ABNORMAL /HPF
FIBRINOGEN PPP-MCNC: 598 MG/DL — HIGH (ref 290–520)
FIBRINOGEN PPP-MCNC: 678 MG/DL — HIGH (ref 290–520)
GLUCOSE SERPL-MCNC: 123 MG/DL — HIGH (ref 70–99)
GLUCOSE SERPL-MCNC: 82 MG/DL — SIGNIFICANT CHANGE UP (ref 70–99)
GLUCOSE UR QL: NEGATIVE — SIGNIFICANT CHANGE UP
GLUCOSE UR QL: NEGATIVE — SIGNIFICANT CHANGE UP
HCT VFR BLD CALC: 38.9 % — SIGNIFICANT CHANGE UP (ref 34.5–45)
HCT VFR BLD CALC: 39.3 % — SIGNIFICANT CHANGE UP (ref 34.5–45)
HGB BLD-MCNC: 12.7 G/DL — SIGNIFICANT CHANGE UP (ref 11.5–15.5)
HGB BLD-MCNC: 12.9 G/DL — SIGNIFICANT CHANGE UP (ref 11.5–15.5)
HIV 1 & 2 AB SERPL IA.RAPID: SIGNIFICANT CHANGE UP
IANC: 8.08 K/UL — SIGNIFICANT CHANGE UP (ref 1.5–8.5)
IMM GRANULOCYTES NFR BLD AUTO: 0.3 % — SIGNIFICANT CHANGE UP (ref 0–1.5)
IMM GRANULOCYTES NFR BLD AUTO: 0.9 % — SIGNIFICANT CHANGE UP (ref 0–1.5)
INR BLD: 0.9 RATIO — SIGNIFICANT CHANGE UP (ref 0.88–1.16)
INR BLD: 0.9 RATIO — SIGNIFICANT CHANGE UP (ref 0.88–1.16)
KETONES UR-MCNC: NEGATIVE — SIGNIFICANT CHANGE UP
KETONES UR-MCNC: NEGATIVE — SIGNIFICANT CHANGE UP
LDH SERPL L TO P-CCNC: 185 U/L — SIGNIFICANT CHANGE UP (ref 135–225)
LDH SERPL L TO P-CCNC: 195 U/L — SIGNIFICANT CHANGE UP (ref 120–225)
LEUKOCYTE ESTERASE UR-ACNC: NEGATIVE — SIGNIFICANT CHANGE UP
LEUKOCYTE ESTERASE UR-ACNC: NEGATIVE — SIGNIFICANT CHANGE UP
LYMPHOCYTES # BLD AUTO: 0.57 K/UL — LOW (ref 1–3.3)
LYMPHOCYTES # BLD AUTO: 2.22 K/UL — SIGNIFICANT CHANGE UP (ref 1–3.3)
LYMPHOCYTES # BLD AUTO: 25.5 % — SIGNIFICANT CHANGE UP (ref 13–44)
LYMPHOCYTES # BLD AUTO: 6.4 % — LOW (ref 13–44)
MAGNESIUM SERPL-MCNC: 4.1 MG/DL — HIGH (ref 1.6–2.6)
MCHC RBC-ENTMCNC: 28 PG — SIGNIFICANT CHANGE UP (ref 27–34)
MCHC RBC-ENTMCNC: 28.3 PG — SIGNIFICANT CHANGE UP (ref 27–34)
MCHC RBC-ENTMCNC: 32.3 GM/DL — SIGNIFICANT CHANGE UP (ref 32–36)
MCHC RBC-ENTMCNC: 33.2 GM/DL — SIGNIFICANT CHANGE UP (ref 32–36)
MCV RBC AUTO: 85.3 FL — SIGNIFICANT CHANGE UP (ref 80–100)
MCV RBC AUTO: 86.8 FL — SIGNIFICANT CHANGE UP (ref 80–100)
MONOCYTES # BLD AUTO: 0.13 K/UL — SIGNIFICANT CHANGE UP (ref 0–0.9)
MONOCYTES # BLD AUTO: 0.68 K/UL — SIGNIFICANT CHANGE UP (ref 0–0.9)
MONOCYTES NFR BLD AUTO: 1.5 % — LOW (ref 2–14)
MONOCYTES NFR BLD AUTO: 7.8 % — SIGNIFICANT CHANGE UP (ref 2–14)
NEUTROPHILS # BLD AUTO: 5.62 K/UL — SIGNIFICANT CHANGE UP (ref 1.8–7.4)
NEUTROPHILS # BLD AUTO: 8.08 K/UL — HIGH (ref 1.8–7.4)
NEUTROPHILS NFR BLD AUTO: 64.8 % — SIGNIFICANT CHANGE UP (ref 43–77)
NEUTROPHILS NFR BLD AUTO: 91 % — HIGH (ref 43–77)
NITRITE UR-MCNC: NEGATIVE — SIGNIFICANT CHANGE UP
NITRITE UR-MCNC: NEGATIVE — SIGNIFICANT CHANGE UP
NRBC # BLD: 0 /100 WBCS — SIGNIFICANT CHANGE UP
NRBC # BLD: 0 /100 WBCS — SIGNIFICANT CHANGE UP (ref 0–0)
NRBC # FLD: 0 K/UL — SIGNIFICANT CHANGE UP
PH UR: 6 — SIGNIFICANT CHANGE UP (ref 5–8)
PH UR: 6 — SIGNIFICANT CHANGE UP (ref 5–8)
PLATELET # BLD AUTO: 276 K/UL — SIGNIFICANT CHANGE UP (ref 150–400)
PLATELET # BLD AUTO: 287 K/UL — SIGNIFICANT CHANGE UP (ref 150–400)
POTASSIUM SERPL-MCNC: 3.7 MMOL/L — SIGNIFICANT CHANGE UP (ref 3.5–5.3)
POTASSIUM SERPL-MCNC: 4.1 MMOL/L — SIGNIFICANT CHANGE UP (ref 3.5–5.3)
POTASSIUM SERPL-SCNC: 3.7 MMOL/L — SIGNIFICANT CHANGE UP (ref 3.5–5.3)
POTASSIUM SERPL-SCNC: 4.1 MMOL/L — SIGNIFICANT CHANGE UP (ref 3.5–5.3)
PROT ?TM UR-MCNC: 128 MG/DL — SIGNIFICANT CHANGE UP
PROT ?TM UR-MCNC: 128 MG/DL — SIGNIFICANT CHANGE UP
PROT ?TM UR-MCNC: 257 MG/DL — HIGH (ref 0–12)
PROT SERPL-MCNC: 6.2 G/DL — SIGNIFICANT CHANGE UP (ref 6–8.3)
PROT SERPL-MCNC: 6.8 G/DL — SIGNIFICANT CHANGE UP (ref 6–8.3)
PROT UR-MCNC: 500 MG/DL
PROT UR-MCNC: ABNORMAL
PROT/CREAT UR-RTO: 1.5 RATIO — HIGH (ref 0–0.2)
PROTHROM AB SERPL-ACNC: 10.4 SEC — LOW (ref 10.6–13.6)
PROTHROM AB SERPL-ACNC: 10.8 SEC — SIGNIFICANT CHANGE UP (ref 10.6–13.6)
RBC # BLD: 4.53 M/UL — SIGNIFICANT CHANGE UP (ref 3.8–5.2)
RBC # BLD: 4.56 M/UL — SIGNIFICANT CHANGE UP (ref 3.8–5.2)
RBC # FLD: 13.5 % — SIGNIFICANT CHANGE UP (ref 10.3–14.5)
RBC # FLD: 13.8 % — SIGNIFICANT CHANGE UP (ref 10.3–14.5)
RBC CASTS # UR COMP ASSIST: ABNORMAL /HPF (ref 0–2)
RH IG SCN BLD-IMP: NEGATIVE — SIGNIFICANT CHANGE UP
RH IG SCN BLD-IMP: NEGATIVE — SIGNIFICANT CHANGE UP
SARS-COV-2 RNA SPEC QL NAA+PROBE: SIGNIFICANT CHANGE UP
SODIUM SERPL-SCNC: 134 MMOL/L — LOW (ref 135–145)
SODIUM SERPL-SCNC: 138 MMOL/L — SIGNIFICANT CHANGE UP (ref 135–145)
SP GR SPEC: 1.02 — SIGNIFICANT CHANGE UP (ref 1.01–1.02)
SP GR SPEC: 1.02 — SIGNIFICANT CHANGE UP (ref 1–1.05)
URATE SERPL-MCNC: 4.4 MG/DL — SIGNIFICANT CHANGE UP (ref 2.5–7)
URATE SERPL-MCNC: 4.7 MG/DL — SIGNIFICANT CHANGE UP (ref 2.5–7)
UROBILINOGEN FLD QL: NEGATIVE — SIGNIFICANT CHANGE UP
UROBILINOGEN FLD QL: SIGNIFICANT CHANGE UP
WBC # BLD: 8.69 K/UL — SIGNIFICANT CHANGE UP (ref 3.8–10.5)
WBC # BLD: 8.88 K/UL — SIGNIFICANT CHANGE UP (ref 3.8–10.5)
WBC # FLD AUTO: 8.69 K/UL — SIGNIFICANT CHANGE UP (ref 3.8–10.5)
WBC # FLD AUTO: 8.88 K/UL — SIGNIFICANT CHANGE UP (ref 3.8–10.5)
WBC UR QL: SIGNIFICANT CHANGE UP /HPF (ref 0–5)

## 2021-12-23 PROCEDURE — 86850 RBC ANTIBODY SCREEN: CPT

## 2021-12-23 PROCEDURE — 86870 RBC ANTIBODY IDENTIFICATION: CPT

## 2021-12-23 PROCEDURE — 85730 THROMBOPLASTIN TIME PARTIAL: CPT

## 2021-12-23 PROCEDURE — G0463: CPT

## 2021-12-23 PROCEDURE — 85384 FIBRINOGEN ACTIVITY: CPT

## 2021-12-23 PROCEDURE — 80053 COMPREHEN METABOLIC PANEL: CPT

## 2021-12-23 PROCEDURE — 86780 TREPONEMA PALLIDUM: CPT

## 2021-12-23 PROCEDURE — 81001 URINALYSIS AUTO W/SCOPE: CPT

## 2021-12-23 PROCEDURE — 99213 OFFICE O/P EST LOW 20 MIN: CPT

## 2021-12-23 PROCEDURE — 36415 COLL VENOUS BLD VENIPUNCTURE: CPT

## 2021-12-23 PROCEDURE — 59025 FETAL NON-STRESS TEST: CPT

## 2021-12-23 PROCEDURE — 82570 ASSAY OF URINE CREATININE: CPT

## 2021-12-23 PROCEDURE — 85025 COMPLETE CBC W/AUTO DIFF WBC: CPT

## 2021-12-23 PROCEDURE — 86077 PHYS BLOOD BANK SERV XMATCH: CPT

## 2021-12-23 PROCEDURE — 86900 BLOOD TYPING SEROLOGIC ABO: CPT

## 2021-12-23 PROCEDURE — 85610 PROTHROMBIN TIME: CPT

## 2021-12-23 PROCEDURE — 87635 SARS-COV-2 COVID-19 AMP PRB: CPT

## 2021-12-23 PROCEDURE — 86880 COOMBS TEST DIRECT: CPT

## 2021-12-23 PROCEDURE — 84550 ASSAY OF BLOOD/URIC ACID: CPT

## 2021-12-23 PROCEDURE — 83615 LACTATE (LD) (LDH) ENZYME: CPT

## 2021-12-23 PROCEDURE — 81003 URINALYSIS AUTO W/O SCOPE: CPT

## 2021-12-23 PROCEDURE — 86703 HIV-1/HIV-2 1 RESULT ANTBDY: CPT

## 2021-12-23 PROCEDURE — 87389 HIV-1 AG W/HIV-1&-2 AB AG IA: CPT

## 2021-12-23 PROCEDURE — 86901 BLOOD TYPING SEROLOGIC RH(D): CPT

## 2021-12-23 PROCEDURE — 84156 ASSAY OF PROTEIN URINE: CPT

## 2021-12-23 RX ORDER — SODIUM CHLORIDE 9 MG/ML
1000 INJECTION, SOLUTION INTRAVENOUS
Refills: 0 | Status: COMPLETED | OUTPATIENT
Start: 2021-12-23 | End: 2021-12-23

## 2021-12-23 RX ORDER — LABETALOL HCL 100 MG
80 TABLET ORAL ONCE
Refills: 0 | Status: COMPLETED | OUTPATIENT
Start: 2021-12-23 | End: 2021-12-23

## 2021-12-23 RX ORDER — FOLIC ACID 0.8 MG
1 TABLET ORAL DAILY
Refills: 0 | Status: DISCONTINUED | OUTPATIENT
Start: 2021-12-23 | End: 2021-12-23

## 2021-12-23 RX ORDER — HYDRALAZINE HCL 50 MG
5 TABLET ORAL ONCE
Refills: 0 | Status: DISCONTINUED | OUTPATIENT
Start: 2021-12-23 | End: 2021-12-23

## 2021-12-23 RX ORDER — DIPHENHYDRAMINE HCL 50 MG
25 CAPSULE ORAL ONCE
Refills: 0 | Status: COMPLETED | OUTPATIENT
Start: 2021-12-23 | End: 2021-12-23

## 2021-12-23 RX ORDER — METOCLOPRAMIDE HCL 10 MG
10 TABLET ORAL ONCE
Refills: 0 | Status: COMPLETED | OUTPATIENT
Start: 2021-12-23 | End: 2021-12-23

## 2021-12-23 RX ORDER — MAGNESIUM SULFATE 500 MG/ML
4 VIAL (ML) INJECTION ONCE
Refills: 0 | Status: COMPLETED | OUTPATIENT
Start: 2021-12-23 | End: 2021-12-23

## 2021-12-23 RX ORDER — SODIUM CHLORIDE 9 MG/ML
1000 INJECTION, SOLUTION INTRAVENOUS
Refills: 0 | Status: DISCONTINUED | OUTPATIENT
Start: 2021-12-23 | End: 2021-12-23

## 2021-12-23 RX ORDER — SODIUM CHLORIDE 9 MG/ML
1000 INJECTION, SOLUTION INTRAVENOUS
Refills: 0 | Status: DISCONTINUED | OUTPATIENT
Start: 2021-12-23 | End: 2021-12-26

## 2021-12-23 RX ORDER — MAGNESIUM SULFATE 500 MG/ML
2 VIAL (ML) INJECTION
Qty: 40 | Refills: 0 | Status: DISCONTINUED | OUTPATIENT
Start: 2021-12-23 | End: 2021-12-23

## 2021-12-23 RX ORDER — ACETAMINOPHEN 500 MG
975 TABLET ORAL ONCE
Refills: 0 | Status: DISCONTINUED | OUTPATIENT
Start: 2021-12-23 | End: 2021-12-26

## 2021-12-23 RX ORDER — FERROUS SULFATE 325(65) MG
325 TABLET ORAL DAILY
Refills: 0 | Status: DISCONTINUED | OUTPATIENT
Start: 2021-12-23 | End: 2021-12-23

## 2021-12-23 RX ORDER — MAGNESIUM SULFATE 500 MG/ML
2 VIAL (ML) INJECTION
Qty: 40 | Refills: 0 | Status: DISCONTINUED | OUTPATIENT
Start: 2021-12-23 | End: 2021-12-24

## 2021-12-23 RX ORDER — ACETAMINOPHEN 500 MG
975 TABLET ORAL ONCE
Refills: 0 | Status: COMPLETED | OUTPATIENT
Start: 2021-12-23 | End: 2021-12-23

## 2021-12-23 RX ORDER — FOLIC ACID 0.8 MG
1 TABLET ORAL DAILY
Refills: 0 | Status: DISCONTINUED | OUTPATIENT
Start: 2021-12-23 | End: 2021-12-26

## 2021-12-23 RX ORDER — NIFEDIPINE 30 MG
30 TABLET, EXTENDED RELEASE 24 HR ORAL DAILY
Refills: 0 | Status: DISCONTINUED | OUTPATIENT
Start: 2021-12-23 | End: 2021-12-24

## 2021-12-23 RX ORDER — METOCLOPRAMIDE HCL 10 MG
10 TABLET ORAL ONCE
Refills: 0 | Status: DISCONTINUED | OUTPATIENT
Start: 2021-12-23 | End: 2021-12-26

## 2021-12-23 RX ORDER — LABETALOL HCL 100 MG
40 TABLET ORAL ONCE
Refills: 0 | Status: COMPLETED | OUTPATIENT
Start: 2021-12-23 | End: 2021-12-23

## 2021-12-23 RX ORDER — LABETALOL HCL 100 MG
20 TABLET ORAL ONCE
Refills: 0 | Status: COMPLETED | OUTPATIENT
Start: 2021-12-23 | End: 2021-12-23

## 2021-12-23 RX ADMIN — Medication 1 TABLET(S): at 23:34

## 2021-12-23 RX ADMIN — Medication 50 GM/HR: at 19:10

## 2021-12-23 RX ADMIN — Medication 50 GM/HR: at 18:34

## 2021-12-23 RX ADMIN — SODIUM CHLORIDE 75 MILLILITER(S): 9 INJECTION, SOLUTION INTRAVENOUS at 13:04

## 2021-12-23 RX ADMIN — Medication 40 MILLIGRAM(S): at 13:09

## 2021-12-23 RX ADMIN — Medication 975 MILLIGRAM(S): at 21:30

## 2021-12-23 RX ADMIN — Medication 50 GM/HR: at 13:24

## 2021-12-23 RX ADMIN — Medication 20 MILLIGRAM(S): at 12:56

## 2021-12-23 RX ADMIN — Medication 12 MILLIGRAM(S): at 13:42

## 2021-12-23 RX ADMIN — Medication 80 MILLIGRAM(S): at 13:36

## 2021-12-23 RX ADMIN — Medication 30 MILLIGRAM(S): at 18:34

## 2021-12-23 RX ADMIN — Medication 300 GRAM(S): at 13:04

## 2021-12-23 RX ADMIN — Medication 25 MILLIGRAM(S): at 20:43

## 2021-12-23 RX ADMIN — Medication 80 MILLIGRAM(S): at 14:00

## 2021-12-23 RX ADMIN — Medication 10 MILLIGRAM(S): at 20:46

## 2021-12-23 RX ADMIN — SODIUM CHLORIDE 50 MILLILITER(S): 9 INJECTION, SOLUTION INTRAVENOUS at 18:35

## 2021-12-23 RX ADMIN — Medication 975 MILLIGRAM(S): at 20:40

## 2021-12-23 NOTE — OB RN PATIENT PROFILE - NSADDITIONRHOGAM_OBGYN_ALL_OB
Andrés Marti [address] [facility], [address]  [phone]             Date of visit: 4/1/2017   Subjective:      History obtained from:  the patient. Giuliano Lozano is a 61 y.o. female who presents today for sick for 3 days getting worse with sinus symptoms, gets sinus infection about once per year. Last one was last March. Having pain in face, teeth, also headache. Lots of yellow green mucus coming from nose. Also a bad cough, coughing up mucus all day long. Feeling run down, chilled. Tried mucinex, thinks it helps a little but still getting worse      Patient Active Problem List    Diagnosis Date Noted    Colon polyp     Colonic polyp-  lg @ hepatic flexure-4/2015 05/29/2015    DCIS (ductal carcinoma in situ) of breast-June 2008 12/20/2010    Arthritis 12/20/2010    Migraine 12/20/2010    GERD (gastroesophageal reflux disease) 12/20/2010    Fibromyalgia 12/20/2010     Current Outpatient Prescriptions   Medication Sig Dispense Refill    amoxicillin-clavulanate (AUGMENTIN) 875-125 mg per tablet Take 1 Tab by mouth two (2) times a day for 7 days. 14 Tab 0    benzonatate (TESSALON) 100 mg capsule Take 1 Cap by mouth three (3) times daily as needed for Cough for up to 10 days. 30 Cap 0    cholecalciferol, vitamin D3, (VITAMIN D3) 2,000 unit tab Take  by mouth.  celecoxib (CELEBREX) 200 mg capsule Take 1 Cap by mouth two (2) times a day. 60 Cap 5    esomeprazole (NEXIUM) 40 mg capsule TAKE ONE (1) CAPSULE(S) TWICE DAILY 60 Cap 5    traMADol (ULTRAM) 50 mg tablet Take 1 Tab by mouth every six (6) hours as needed for Pain. 30 Tab 5    SUMAtriptan (IMITREX) 50 mg tablet Take 1 Tab by mouth once as needed for Migraine for up to 1 dose. May repeat at 2 hrs 10 Tab 5    metoprolol tartrate (LOPRESSOR) 50 mg tablet Take 1 Tab by mouth two (2) times a day. 60 Tab 12    aspirin delayed-release 81 mg tablet Take 1 Tab by mouth daily.       cyanocobalamin (VITAMIN B-12) 1,000 mcg tablet Take 1,000 mcg by mouth daily.  cyclosporine (RESTASIS) 0.05 % ophthalmic emulsion Administer 1 Drop to both eyes two (2) times a day. Allergies   Allergen Reactions    Aspirin Nausea and Vomiting    Betadine [Povidone-Iodine] Rash    Nsaids (Non-Steroidal Anti-Inflammatory Drug) Nausea and Vomiting    Other Medication Nausea and Vomiting     General anas. .. - Pt does not know what this is. Past Medical History:   Diagnosis Date    Arthritis 2010    Cancer [facility]) 2008    right breast cancer    Colon polyp     DCIS (ductal carcinoma in situ) of breast 2010    right/surgery and radiation    Fibromyalgia 2010    GERD (gastroesophageal reflux disease) 2010    Migraine 2010    Nausea & vomiting      Past Surgical History:   Procedure Laterality Date    COLONOSCOPY N/A 2016    COLONOSCOPY performed by Seymour Ferguson MD at [address] [place] [address]       x2    HX APPENDECTOMY      HX MASTECTOMY  2008    right,then reconstruction/implant     Family History   Problem Relation Age of Onset    Coronary Artery Disease Mother     Hypertension Mother     Heart Disease Mother     Diabetes Mother     Pulmonary Embolism Mother     Coronary Artery Disease Father     Heart Disease Father     Hypertension Father     Kidney Disease Father     Thyroid Disease Father      Social History   Substance Use Topics    Smoking status: Never Smoker    Smokeless tobacco: Never Used    Alcohol use No      Social History     Social History Narrative        Review of Systems  GI: denies nausea, vomiting, or diarrhea  Gen: denies fever but has felt chilled   Eye: denies eye drainage     Objective:     Vitals:    17 0851 17 0906   BP: 140/84 120/80   Pulse: 88    Resp: 18    Temp: 98.1 °F (36.7 °C)    TempSrc: Oral    SpO2: 98%    Weight: 162 lb 9.6 oz (73.8 kg)    Height: 5' 2\" (1.575 m)      Body mass index is 29.74 kg/(m^2). General: stated age, well nourished, and in NAD  Neck: supple, symmetrical, trachea midline, no adenopathy and thyroid: not enlarged, symmetric, no tenderness/mass/nodules  Ears: TMs clear bilaterally, canals patent without inflammation  Nose: yellow drainage, turbinates swollen shut and red bilaterally  Throat: clear, no tonsillar exudate or erthema  Mouth: no lesions noted  Lungs:  clear to auscultation w/o rales, rhonchi, wheezes w/normal effort and no use of accessory muscles of respiration   Heart: regular rate and rhythm, S1, S2 normal, no murmur, click, rub or gallop  Lymph: no cervical adenopathy appreciated  Ext: no edema  Skin:  No rashes or lesions     Assessment/Plan:       ICD-10-CM ICD-9-CM    1. Acute non-recurrent maxillary sinusitis J01.00 461.0    2. Bronchitis J40 490         Orders Placed This Encounter    amoxicillin-clavulanate (AUGMENTIN) 875-125 mg per tablet    benzonatate (TESSALON) 100 mg capsule       Due to rapid worsening and severity of symptoms will treat with antibiotics  Also try tessalon for cough  Continue prn mucinex as it helps some  Advised trying nasal saline  Drink plenty of fluids, rest  Reviewed worrisome signs or symptoms for which to call. Discussed the diagnosis and plan and she expressed understanding. Follow-up Disposition:  Return in about 3 months (around 7/1/2017) for physical (no pap).     Rigoberto Wood MD 7 weeks

## 2021-12-23 NOTE — OB PROVIDER H&P - NSHPLABSRESULTS_GEN_ALL_CORE
12.7   8.88  )-----------( 276      ( 12-23 @ 18:52 )             39.3     12-23 @ 18:52    134  |  103  |  9   ----------------------------<  123  4.1   |  19  |  0.46    Ca    8.6      12-23 @ 18:52  Mg     4.10     12-23 @ 18:52    TPro  6.2  /  Alb  3.4  /  TBili  <0.2  /  DBili  x   /  AST  20  /  ALT  29  /  AlkPhos  85  12-23 @ 18:52    PT/INR - ( 12-23 @ 18:52 )   PT: 10.4 sec;   INR: 0.90 ratio  PTT - ( 12-23 @ 18:52 )  PTT:26.5 sec

## 2021-12-23 NOTE — PATIENT PROFILE OB - FALL HARM RISK - UNIVERSAL INTERVENTIONS
Bed in lowest position, wheels locked, appropriate side rails in place/Call bell, personal items and telephone in reach/Instruct patient to call for assistance before getting out of bed or chair/Non-slip footwear when patient is out of bed/[place] to call system/Physically safe environment - no spills, clutter or unnecessary equipment/Purposeful Proactive Rounding/Room/bathroom lighting operational, light cord in reach

## 2021-12-23 NOTE — OB PROVIDER H&P - NSHPPHYSICALEXAM_GEN_ALL_CORE
Vital Signs Last 24 Hrs  T(C): 36.5 (23 Dec 2021 17:57), Max: 36.7 (23 Dec 2021 10:29)  T(F): 97.7 (23 Dec 2021 17:57), Max: 98 (23 Dec 2021 10:29)  HR: 89 (23 Dec 2021 22:02) (72 - 110)  BP: 134/64 (23 Dec 2021 21:58) (132/63 - 164/78)  RR: 18 (23 Dec 2021 17:57) (18 - 19)  SpO2: 91% (23 Dec 2021 22:02) (87% - 98%)    Gen: A&Ox3, NAD, well appearing   CV: RRR  Pulm: Respirations even, unlabored  Abd: Soft, gravid, nontender     EFM: Discontinuous tracing, baseline 130 +accels no apparent decels   [place]iv[place]: No ctx noted     Sono: Breech presentation, MVP 3.98, +gross fetal movement

## 2021-12-23 NOTE — ACUTE INTERFACILITY TRANSFER NOTE - HOSPITAL COURSE
admitted for severe pec   severe ranges bp  labetalol pushes 20mg->40mg->80mg->80mg  mgso4  celestone for prematurity   -hoffman  -iv fluids 75ml  -transfer urgent to [facility]

## 2021-12-23 NOTE — OB PROVIDER H&P - HISTORY OF PRESENT ILLNESS
26y  [facility] 3/6/21 transferred from [facility] / newly diagnosed severe preeclampsia. Patient endorsing headache since arrival, however denies vision changes, URQ pain, CP or SOB. She notes +FM, denies Ctx, LOF, VB.     PNC:    - Labile BP's, proteinuria (P/C: 0.6 as outpatient)    - Obesity  26y  EDC 29w4d 3/6/21 transferred from [facility]  newly diagnosed severe preeclampsia.     Patient was seen by her OB for a routine visit Monday, where BPs were noted to be mild range. HELLP labs were sent and notable for P:C of 0.6. She returned to the office earlier today for a BP check, where BP was noted to be severe range. She was then referred to [facility]. Her BPs upon arrival were persistently severe. She received Labetalol 20/40/80, was started on Mg and Procardia 30 XL, and transferred to [facility].     Upon arrival to [facility], BPs have been mild range. Patient endorsing headache, however denies vision changes, URQ pain, CP or SOB. She notes +FM, denies Ctx, LOF, VB.     PNC:    - Labile BP's, proteinuria (P/C: 0.6 as outpatient)   - 3h GTT: wnl   - Hgb A1C (2021): wnl   - MFM sono (11/15): Anterior placenta, breech position, EFW 16%ile, AC 12%ile    ObHx:   - sAB x1, no D&C  GynHx: Denies  PMHx:   - Obesity   - Denies h/o asthma, HTN or diabetes    PSHx: Denies  Meds: PNV  Allergies: NKDA

## 2021-12-23 NOTE — ACUTE INTERFACILITY TRANSFER NOTE - NS AS DC PROVIDER CONTACT Y/N MULTI
-- Message is from the [facility]--    Reason for Call: Patient was advised to call on the first of her menstrual cycle to complete a CAT SCAN. Patient is requesting a call to assist her.    Caller Information       Type Contact Phone    02/09/2019 04:52 AM Phone (Incoming) PratibhaNataliyaa (Self) [phone] (H)          Alternative phone number: N/A    Turnaround time given to caller:   \"This message will be sent to [state Provider's name]. The clinical team will fulfill your request as soon as they review your message when the office opens on Monday (or after the holiday).\"    
Left message for patient to call [phone] to schedule her mri between day 7- and day 14  
Patient called regarding ? Cat scan.  Will clarify if it is cat scan or breast mri.    
Yes

## 2021-12-24 LAB
COVID-19 SPIKE DOMAIN AB INTERP: POSITIVE
COVID-19 SPIKE DOMAIN ANTIBODY RESULT: >250 U/ML — HIGH
HIV 1+2 AB+HIV1 P24 AG SERPL QL IA: SIGNIFICANT CHANGE UP
MAGNESIUM SERPL-MCNC: 5.2 MG/DL — HIGH (ref 1.6–2.6)
MAGNESIUM SERPL-MCNC: 5.5 MG/DL — HIGH (ref 1.6–2.6)
MAGNESIUM SERPL-MCNC: 5.6 MG/DL — HIGH (ref 1.6–2.6)
SARS-COV-2 IGG+IGM SERPL QL IA: >250 U/ML — HIGH
SARS-COV-2 IGG+IGM SERPL QL IA: POSITIVE
T PALLIDUM AB TITR SER: NEGATIVE — SIGNIFICANT CHANGE UP

## 2021-12-24 PROCEDURE — 99221 1ST HOSP IP/OBS SF/LOW 40: CPT

## 2021-12-24 RX ORDER — NIFEDIPINE 30 MG
60 TABLET, EXTENDED RELEASE 24 HR ORAL DAILY
Refills: 0 | Status: DISCONTINUED | OUTPATIENT
Start: 2021-12-24 | End: 2021-12-26

## 2021-12-24 RX ORDER — HEPARIN SODIUM 5000 [USP'U]/ML
5000 INJECTION INTRAVENOUS; SUBCUTANEOUS EVERY 8 HOURS
Refills: 0 | Status: DISCONTINUED | OUTPATIENT
Start: 2021-12-24 | End: 2021-12-26

## 2021-12-24 RX ORDER — HEPARIN SODIUM 5000 [USP'U]/ML
5000 INJECTION INTRAVENOUS; SUBCUTANEOUS EVERY 12 HOURS
Refills: 0 | Status: DISCONTINUED | OUTPATIENT
Start: 2021-12-24 | End: 2021-12-24

## 2021-12-24 RX ORDER — LABETALOL HCL 100 MG
20 TABLET ORAL ONCE
Refills: 0 | Status: COMPLETED | OUTPATIENT
Start: 2021-12-24 | End: 2021-12-24

## 2021-12-24 RX ADMIN — Medication 60 MILLIGRAM(S): at 18:07

## 2021-12-24 RX ADMIN — Medication 50 GM/HR: at 14:07

## 2021-12-24 RX ADMIN — Medication 1 TABLET(S): at 18:07

## 2021-12-24 RX ADMIN — Medication 12 MILLIGRAM(S): at 13:55

## 2021-12-24 RX ADMIN — HEPARIN SODIUM 5000 UNIT(S): 5000 INJECTION INTRAVENOUS; SUBCUTANEOUS at 21:42

## 2021-12-24 RX ADMIN — Medication 50 GM/HR: at 07:08

## 2021-12-24 RX ADMIN — Medication 20 MILLIGRAM(S): at 12:06

## 2021-12-24 RX ADMIN — Medication 1 MILLIGRAM(S): at 18:07

## 2021-12-24 NOTE — CHART NOTE - NSCHARTNOTEFT_GEN_A_CORE
R3 Note    Bedside ultrasound performed:  audie breech presentation  posterior placenta  MVP 5-6  EFW 911g    Pt scanned w/ Dr. Valentín Good PGY3

## 2021-12-24 NOTE — PROGRESS NOTE ADULT - ASSESSMENT
26y  at 29w5d (EDC 3/6) transferred from [facility] for sPEC diagnosed at 29w4d. Patient is s/p Labetalol 20/40/80 IVP while at [facility], started on Procardia 30 XL for blood pressure maintenance. BPs currently mild range. Overall reassuring maternal and fetal status at this time.     #sPEC   - c/w Magnesium for seizure prophylaxis (-)  - c/w betamethasone for fetal lung maturity, second dose due @2PM   - c/w Procardia 30 XL QD for BP control  - s/p Tylenol, Reglan, Benadryl for headache with improvement    #Fetal well-being  - Continuous NST while on magnesium then transition to BID NST  - For ATU sono  - c/w PNV  - NICU consult  - f/u GBS result, sent     #Maternal Well-being  - CLD overnight  - LR@50 while on Mag then SLIV  - SCDs, ambulation for DVT prophylaxis    #MOD   - Fetus currently in breech presentation    - Anticipate delivery at 34w unless deterioration in maternal or fetal status    -  consents signed, in chart    Alice Marcial MD, PGY3

## 2021-12-24 NOTE — PROGRESS NOTE ADULT - ASSESSMENT
27 yo  at 29 wks 5 days gestation admitted with preeclampsia with severe features, clinically stable at this time    1. Preeclampsia w/ Severe features  - No prior history of hypertension. Severe BP on arrival, s/p Labetalol 20/40/80 immediate release medication, started on Procardia 30 mg XL and transferred to [facility]  - BPs now 140s-150s/70s-90s  - Patient denies any s/sx of preeclampsia  - On Magnesium for seizure prophylaxis, will discontinue at this time    2. Fetus  -   - Breech presentation  - For NICU consultation  - S/p BMZ for fetal lung maturity  - FHR reactive with reassuring fetal status, AGA on bedside sono  - For NST BID    3. Maternal  - Regular diet  - Encourage ambulation  - SCDs while in bed  - Heparin SC    Anticipate delivery at 34 weeks, or sooner pending clinical status. Patient counseled on recommendation for admission, reviewed Hypertensive disorder of pregnancy. Discussed risks of preeclampsia including seizure, stroke, liver failure, kidney failure, death. Reviewed warning signs to call for provider assessment including: headache, vision changes, right upper quadrant pain, or other change in clinical status. Discussed possible indication for early delivery including worsening blood pressure control, lab abnormalities, ecclampsia, nonreassuring fetal status. Mode of delivery pending maternal/fetal status. Currently breech presentation.     Patient seen and counseled with Dr. Monterroso and Dr. Valentín Nava MD  Fellow, Maternal Fetal Medicine

## 2021-12-25 ENCOUNTER — TRANSCRIPTION ENCOUNTER (OUTPATIENT)
Age: 26
End: 2021-12-25

## 2021-12-25 PROCEDURE — 99232 SBSQ HOSP IP/OBS MODERATE 35: CPT | Mod: GC

## 2021-12-25 RX ADMIN — Medication 60 MILLIGRAM(S): at 17:37

## 2021-12-25 RX ADMIN — HEPARIN SODIUM 5000 UNIT(S): 5000 INJECTION INTRAVENOUS; SUBCUTANEOUS at 13:29

## 2021-12-25 RX ADMIN — HEPARIN SODIUM 5000 UNIT(S): 5000 INJECTION INTRAVENOUS; SUBCUTANEOUS at 05:52

## 2021-12-25 RX ADMIN — Medication 1 MILLIGRAM(S): at 12:18

## 2021-12-25 RX ADMIN — HEPARIN SODIUM 5000 UNIT(S): 5000 INJECTION INTRAVENOUS; SUBCUTANEOUS at 22:50

## 2021-12-25 RX ADMIN — Medication 1 TABLET(S): at 12:18

## 2021-12-25 NOTE — CHART NOTE - NSCHARTNOTEFT_GEN_A_CORE
R3 Note      BPP performed at bedside 2/2 NRNST x 2 hours (minimal variability)  BPP: 8/8, breech, posterior, MVP 4.0, prominent fetal bladder    C. Diamant, PGY-3  d/w Dr. Carey ()

## 2021-12-25 NOTE — PROGRESS NOTE ADULT - ASSESSMENT
26y  at 29w6d (EDC 3/6) transferred from [facility] for sPEC diagnosed at 29w4d. Patient is s/p Labetalol 20/40/80 IVP while at [facility], started on Procardia 30 XL for blood pressure maintenance. BPs mild range. Overall reassuring maternal and fetal status at this time.     #sPEC   - s/p Magnesium for seizure prophylaxis (-)  - s/p betamethasone for fetal lung maturity (-)  - c/w Procardia 30 XL QD for BP control    #Fetal well-being  - BID NST  - For ATU sono   - c/w PNV  - NICU consult  - f/u GBS result, sent     #Maternal Well-being  - Regular diet  - SLIV  - HSQ 5k TID, SCDs, ambulation for DVT prophylaxis    #MOD   - Fetus currently in breech presentation    - Anticipate delivery at 34w unless deterioration in maternal or fetal status    -  consents signed, in chart    Alice Marcial MD, PGY3

## 2021-12-26 ENCOUNTER — RESULT REVIEW (OUTPATIENT)
Age: 26
End: 2021-12-26

## 2021-12-26 LAB
ALBUMIN SERPL ELPH-MCNC: 3.2 G/DL — LOW (ref 3.3–5)
ALBUMIN SERPL ELPH-MCNC: 3.4 G/DL — SIGNIFICANT CHANGE UP (ref 3.3–5)
ALP SERPL-CCNC: 82 U/L — SIGNIFICANT CHANGE UP (ref 40–120)
ALP SERPL-CCNC: 87 U/L — SIGNIFICANT CHANGE UP (ref 40–120)
ALT FLD-CCNC: 19 U/L — SIGNIFICANT CHANGE UP (ref 4–33)
ALT FLD-CCNC: 20 U/L — SIGNIFICANT CHANGE UP (ref 4–33)
ANION GAP SERPL CALC-SCNC: 12 MMOL/L — SIGNIFICANT CHANGE UP (ref 7–14)
ANION GAP SERPL CALC-SCNC: 9 MMOL/L — SIGNIFICANT CHANGE UP (ref 7–14)
APTT BLD: 25.6 SEC — LOW (ref 27–36.3)
APTT BLD: 27.4 SEC — SIGNIFICANT CHANGE UP (ref 27–36.3)
AST SERPL-CCNC: 14 U/L — SIGNIFICANT CHANGE UP (ref 4–32)
AST SERPL-CCNC: 17 U/L — SIGNIFICANT CHANGE UP (ref 4–32)
BASOPHILS # BLD AUTO: 0.03 K/UL — SIGNIFICANT CHANGE UP (ref 0–0.2)
BASOPHILS # BLD AUTO: 0.04 K/UL — SIGNIFICANT CHANGE UP (ref 0–0.2)
BASOPHILS NFR BLD AUTO: 0.3 % — SIGNIFICANT CHANGE UP (ref 0–2)
BASOPHILS NFR BLD AUTO: 0.4 % — SIGNIFICANT CHANGE UP (ref 0–2)
BILIRUB SERPL-MCNC: <0.2 MG/DL — SIGNIFICANT CHANGE UP (ref 0.2–1.2)
BILIRUB SERPL-MCNC: <0.2 MG/DL — SIGNIFICANT CHANGE UP (ref 0.2–1.2)
BLD GP AB SCN SERPL QL: POSITIVE — SIGNIFICANT CHANGE UP
BUN SERPL-MCNC: 18 MG/DL — SIGNIFICANT CHANGE UP (ref 7–23)
BUN SERPL-MCNC: 20 MG/DL — SIGNIFICANT CHANGE UP (ref 7–23)
CALCIUM SERPL-MCNC: 8.3 MG/DL — LOW (ref 8.4–10.5)
CALCIUM SERPL-MCNC: 9.1 MG/DL — SIGNIFICANT CHANGE UP (ref 8.4–10.5)
CHLORIDE SERPL-SCNC: 103 MMOL/L — SIGNIFICANT CHANGE UP (ref 98–107)
CHLORIDE SERPL-SCNC: 106 MMOL/L — SIGNIFICANT CHANGE UP (ref 98–107)
CO2 SERPL-SCNC: 21 MMOL/L — LOW (ref 22–31)
CO2 SERPL-SCNC: 22 MMOL/L — SIGNIFICANT CHANGE UP (ref 22–31)
CREAT SERPL-MCNC: 0.58 MG/DL — SIGNIFICANT CHANGE UP (ref 0.5–1.3)
CREAT SERPL-MCNC: 0.63 MG/DL — SIGNIFICANT CHANGE UP (ref 0.5–1.3)
EOSINOPHIL # BLD AUTO: 0.01 K/UL — SIGNIFICANT CHANGE UP (ref 0–0.5)
EOSINOPHIL # BLD AUTO: 0.02 K/UL — SIGNIFICANT CHANGE UP (ref 0–0.5)
EOSINOPHIL NFR BLD AUTO: 0.1 % — SIGNIFICANT CHANGE UP (ref 0–6)
EOSINOPHIL NFR BLD AUTO: 0.2 % — SIGNIFICANT CHANGE UP (ref 0–6)
FIBRINOGEN PPP-MCNC: 352 MG/DL — SIGNIFICANT CHANGE UP (ref 290–520)
FIBRINOGEN PPP-MCNC: 416 MG/DL — SIGNIFICANT CHANGE UP (ref 290–520)
GLUCOSE SERPL-MCNC: 77 MG/DL — SIGNIFICANT CHANGE UP (ref 70–99)
GLUCOSE SERPL-MCNC: 85 MG/DL — SIGNIFICANT CHANGE UP (ref 70–99)
GROUP B BETA STREP DNA (PCR): DETECTED
GROUP B BETA STREP INTERPRETATION: SIGNIFICANT CHANGE UP
HCT VFR BLD CALC: 38.7 % — SIGNIFICANT CHANGE UP (ref 34.5–45)
HCT VFR BLD CALC: 41.1 % — SIGNIFICANT CHANGE UP (ref 34.5–45)
HGB BLD-MCNC: 12.1 G/DL — SIGNIFICANT CHANGE UP (ref 11.5–15.5)
HGB BLD-MCNC: 13 G/DL — SIGNIFICANT CHANGE UP (ref 11.5–15.5)
IANC: 5.05 K/UL — SIGNIFICANT CHANGE UP (ref 1.5–8.5)
IANC: 5.4 K/UL — SIGNIFICANT CHANGE UP (ref 1.5–8.5)
IMM GRANULOCYTES NFR BLD AUTO: 1 % — SIGNIFICANT CHANGE UP (ref 0–1.5)
IMM GRANULOCYTES NFR BLD AUTO: 1.1 % — SIGNIFICANT CHANGE UP (ref 0–1.5)
INR BLD: <0.9 RATIO — LOW (ref 0.88–1.16)
INR BLD: <0.9 RATIO — LOW (ref 0.88–1.16)
LDH SERPL L TO P-CCNC: 213 U/L — SIGNIFICANT CHANGE UP (ref 135–225)
LDH SERPL L TO P-CCNC: 219 U/L — SIGNIFICANT CHANGE UP (ref 135–225)
LYMPHOCYTES # BLD AUTO: 2.55 K/UL — SIGNIFICANT CHANGE UP (ref 1–3.3)
LYMPHOCYTES # BLD AUTO: 28.7 % — SIGNIFICANT CHANGE UP (ref 13–44)
LYMPHOCYTES # BLD AUTO: 3.36 K/UL — HIGH (ref 1–3.3)
LYMPHOCYTES # BLD AUTO: 32.1 % — SIGNIFICANT CHANGE UP (ref 13–44)
MCHC RBC-ENTMCNC: 28.4 PG — SIGNIFICANT CHANGE UP (ref 27–34)
MCHC RBC-ENTMCNC: 28.6 PG — SIGNIFICANT CHANGE UP (ref 27–34)
MCHC RBC-ENTMCNC: 31.3 GM/DL — LOW (ref 32–36)
MCHC RBC-ENTMCNC: 31.6 GM/DL — LOW (ref 32–36)
MCV RBC AUTO: 89.9 FL — SIGNIFICANT CHANGE UP (ref 80–100)
MCV RBC AUTO: 91.5 FL — SIGNIFICANT CHANGE UP (ref 80–100)
MONOCYTES # BLD AUTO: 1.16 K/UL — HIGH (ref 0–0.9)
MONOCYTES # BLD AUTO: 1.55 K/UL — HIGH (ref 0–0.9)
MONOCYTES NFR BLD AUTO: 13 % — SIGNIFICANT CHANGE UP (ref 2–14)
MONOCYTES NFR BLD AUTO: 14.8 % — HIGH (ref 2–14)
NEUTROPHILS # BLD AUTO: 5.05 K/UL — SIGNIFICANT CHANGE UP (ref 1.8–7.4)
NEUTROPHILS # BLD AUTO: 5.4 K/UL — SIGNIFICANT CHANGE UP (ref 1.8–7.4)
NEUTROPHILS NFR BLD AUTO: 51.5 % — SIGNIFICANT CHANGE UP (ref 43–77)
NEUTROPHILS NFR BLD AUTO: 56.8 % — SIGNIFICANT CHANGE UP (ref 43–77)
NRBC # BLD: 0 /100 WBCS — SIGNIFICANT CHANGE UP
NRBC # BLD: 0 /100 WBCS — SIGNIFICANT CHANGE UP
NRBC # FLD: 0 K/UL — SIGNIFICANT CHANGE UP
NRBC # FLD: 0 K/UL — SIGNIFICANT CHANGE UP
PLATELET # BLD AUTO: 303 K/UL — SIGNIFICANT CHANGE UP (ref 150–400)
PLATELET # BLD AUTO: 339 K/UL — SIGNIFICANT CHANGE UP (ref 150–400)
POTASSIUM SERPL-MCNC: 4.6 MMOL/L — SIGNIFICANT CHANGE UP (ref 3.5–5.3)
POTASSIUM SERPL-MCNC: 4.8 MMOL/L — SIGNIFICANT CHANGE UP (ref 3.5–5.3)
POTASSIUM SERPL-SCNC: 4.6 MMOL/L — SIGNIFICANT CHANGE UP (ref 3.5–5.3)
POTASSIUM SERPL-SCNC: 4.8 MMOL/L — SIGNIFICANT CHANGE UP (ref 3.5–5.3)
PROT SERPL-MCNC: 5.6 G/DL — LOW (ref 6–8.3)
PROT SERPL-MCNC: 6 G/DL — SIGNIFICANT CHANGE UP (ref 6–8.3)
PROTHROM AB SERPL-ACNC: 10 SEC — LOW (ref 10.6–13.6)
PROTHROM AB SERPL-ACNC: 10 SEC — LOW (ref 10.6–13.6)
RBC # BLD: 4.23 M/UL — SIGNIFICANT CHANGE UP (ref 3.8–5.2)
RBC # BLD: 4.57 M/UL — SIGNIFICANT CHANGE UP (ref 3.8–5.2)
RBC # FLD: 14.1 % — SIGNIFICANT CHANGE UP (ref 10.3–14.5)
RBC # FLD: 14.5 % — SIGNIFICANT CHANGE UP (ref 10.3–14.5)
RH IG SCN BLD-IMP: NEGATIVE — SIGNIFICANT CHANGE UP
SODIUM SERPL-SCNC: 136 MMOL/L — SIGNIFICANT CHANGE UP (ref 135–145)
SODIUM SERPL-SCNC: 137 MMOL/L — SIGNIFICANT CHANGE UP (ref 135–145)
SOURCE GROUP B STREP: SIGNIFICANT CHANGE UP
URATE SERPL-MCNC: 4.6 MG/DL — SIGNIFICANT CHANGE UP (ref 2.5–7)
URATE SERPL-MCNC: 4.7 MG/DL — SIGNIFICANT CHANGE UP (ref 2.5–7)
WBC # BLD: 10.47 K/UL — SIGNIFICANT CHANGE UP (ref 3.8–10.5)
WBC # BLD: 8.9 K/UL — SIGNIFICANT CHANGE UP (ref 3.8–10.5)
WBC # FLD AUTO: 10.47 K/UL — SIGNIFICANT CHANGE UP (ref 3.8–10.5)
WBC # FLD AUTO: 8.9 K/UL — SIGNIFICANT CHANGE UP (ref 3.8–10.5)

## 2021-12-26 PROCEDURE — 99232 SBSQ HOSP IP/OBS MODERATE 35: CPT | Mod: GC

## 2021-12-26 PROCEDURE — 88307 TISSUE EXAM BY PATHOLOGIST: CPT | Mod: 26

## 2021-12-26 PROCEDURE — 59514 CESAREAN DELIVERY ONLY: CPT | Mod: U7,UB,GC

## 2021-12-26 RX ORDER — NIFEDIPINE 30 MG
10 TABLET, EXTENDED RELEASE 24 HR ORAL ONCE
Refills: 0 | Status: COMPLETED | OUTPATIENT
Start: 2021-12-26 | End: 2021-12-26

## 2021-12-26 RX ORDER — IBUPROFEN 200 MG
600 TABLET ORAL EVERY 6 HOURS
Refills: 0 | Status: COMPLETED | OUTPATIENT
Start: 2021-12-26 | End: 2022-11-24

## 2021-12-26 RX ORDER — MAGNESIUM HYDROXIDE 400 MG/1
30 TABLET, CHEWABLE ORAL
Refills: 0 | Status: DISCONTINUED | OUTPATIENT
Start: 2021-12-26 | End: 2021-12-30

## 2021-12-26 RX ORDER — LABETALOL HCL 100 MG
20 TABLET ORAL ONCE
Refills: 0 | Status: COMPLETED | OUTPATIENT
Start: 2021-12-26 | End: 2021-12-26

## 2021-12-26 RX ORDER — NIFEDIPINE 30 MG
90 TABLET, EXTENDED RELEASE 24 HR ORAL DAILY
Refills: 0 | Status: DISCONTINUED | OUTPATIENT
Start: 2021-12-26 | End: 2021-12-26

## 2021-12-26 RX ORDER — OXYCODONE HYDROCHLORIDE 5 MG/1
5 TABLET ORAL
Refills: 0 | Status: COMPLETED | OUTPATIENT
Start: 2021-12-26 | End: 2022-01-02

## 2021-12-26 RX ORDER — TETANUS TOXOID, REDUCED DIPHTHERIA TOXOID AND ACELLULAR PERTUSSIS VACCINE, ADSORBED 5; 2.5; 8; 8; 2.5 [IU]/.5ML; [IU]/.5ML; UG/.5ML; UG/.5ML; UG/.5ML
0.5 SUSPENSION INTRAMUSCULAR ONCE
Refills: 0 | Status: DISCONTINUED | OUTPATIENT
Start: 2021-12-26 | End: 2021-12-30

## 2021-12-26 RX ORDER — LABETALOL HCL 100 MG
40 TABLET ORAL ONCE
Refills: 0 | Status: DISCONTINUED | OUTPATIENT
Start: 2021-12-26 | End: 2021-12-26

## 2021-12-26 RX ORDER — SIMETHICONE 80 MG/1
80 TABLET, CHEWABLE ORAL EVERY 4 HOURS
Refills: 0 | Status: DISCONTINUED | OUTPATIENT
Start: 2021-12-26 | End: 2021-12-30

## 2021-12-26 RX ORDER — FAMOTIDINE 10 MG/ML
20 INJECTION INTRAVENOUS ONCE
Refills: 0 | Status: COMPLETED | OUTPATIENT
Start: 2021-12-26 | End: 2021-12-26

## 2021-12-26 RX ORDER — SODIUM CHLORIDE 9 MG/ML
1000 INJECTION, SOLUTION INTRAVENOUS
Refills: 0 | Status: DISCONTINUED | OUTPATIENT
Start: 2021-12-26 | End: 2021-12-27

## 2021-12-26 RX ORDER — LABETALOL HCL 100 MG
20 TABLET ORAL ONCE
Refills: 0 | Status: DISCONTINUED | OUTPATIENT
Start: 2021-12-26 | End: 2021-12-26

## 2021-12-26 RX ORDER — DIPHENHYDRAMINE HCL 50 MG
25 CAPSULE ORAL EVERY 6 HOURS
Refills: 0 | Status: DISCONTINUED | OUTPATIENT
Start: 2021-12-26 | End: 2021-12-30

## 2021-12-26 RX ORDER — CITRIC ACID/SODIUM CITRATE 300-500 MG
30 SOLUTION, ORAL ORAL ONCE
Refills: 0 | Status: DISCONTINUED | OUTPATIENT
Start: 2021-12-26 | End: 2021-12-26

## 2021-12-26 RX ORDER — SODIUM CHLORIDE 9 MG/ML
1000 INJECTION, SOLUTION INTRAVENOUS ONCE
Refills: 0 | Status: DISCONTINUED | OUTPATIENT
Start: 2021-12-26 | End: 2021-12-26

## 2021-12-26 RX ORDER — ACETAMINOPHEN 500 MG
975 TABLET ORAL
Refills: 0 | Status: DISCONTINUED | OUTPATIENT
Start: 2021-12-26 | End: 2021-12-30

## 2021-12-26 RX ORDER — OXYCODONE HYDROCHLORIDE 5 MG/1
5 TABLET ORAL ONCE
Refills: 0 | Status: DISCONTINUED | OUTPATIENT
Start: 2021-12-26 | End: 2021-12-30

## 2021-12-26 RX ORDER — OXYTOCIN 10 UNIT/ML
333.33 VIAL (ML) INJECTION
Qty: 20 | Refills: 0 | Status: DISCONTINUED | OUTPATIENT
Start: 2021-12-26 | End: 2021-12-26

## 2021-12-26 RX ORDER — KETOROLAC TROMETHAMINE 30 MG/ML
30 SYRINGE (ML) INJECTION EVERY 6 HOURS
Refills: 0 | Status: COMPLETED | OUTPATIENT
Start: 2021-12-26 | End: 2021-12-27

## 2021-12-26 RX ORDER — MAGNESIUM SULFATE 500 MG/ML
4 VIAL (ML) INJECTION ONCE
Refills: 0 | Status: COMPLETED | OUTPATIENT
Start: 2021-12-26 | End: 2021-12-26

## 2021-12-26 RX ORDER — OXYTOCIN 10 UNIT/ML
333.33 VIAL (ML) INJECTION
Qty: 20 | Refills: 0 | Status: DISCONTINUED | OUTPATIENT
Start: 2021-12-26 | End: 2021-12-30

## 2021-12-26 RX ORDER — SODIUM CHLORIDE 9 MG/ML
1000 INJECTION, SOLUTION INTRAVENOUS
Refills: 0 | Status: DISCONTINUED | OUTPATIENT
Start: 2021-12-26 | End: 2021-12-26

## 2021-12-26 RX ORDER — LANOLIN
1 OINTMENT (GRAM) TOPICAL EVERY 6 HOURS
Refills: 0 | Status: DISCONTINUED | OUTPATIENT
Start: 2021-12-26 | End: 2021-12-30

## 2021-12-26 RX ORDER — LABETALOL HCL 100 MG
80 TABLET ORAL ONCE
Refills: 0 | Status: COMPLETED | OUTPATIENT
Start: 2021-12-26 | End: 2021-12-26

## 2021-12-26 RX ADMIN — Medication 30 MILLILITER(S): at 20:56

## 2021-12-26 RX ADMIN — Medication 1 TABLET(S): at 14:29

## 2021-12-26 RX ADMIN — HEPARIN SODIUM 5000 UNIT(S): 5000 INJECTION INTRAVENOUS; SUBCUTANEOUS at 06:07

## 2021-12-26 RX ADMIN — Medication 300 GRAM(S): at 18:57

## 2021-12-26 RX ADMIN — HEPARIN SODIUM 5000 UNIT(S): 5000 INJECTION INTRAVENOUS; SUBCUTANEOUS at 14:00

## 2021-12-26 RX ADMIN — Medication 10 MILLIGRAM(S): at 14:45

## 2021-12-26 RX ADMIN — Medication 20 MILLIGRAM(S): at 18:20

## 2021-12-26 RX ADMIN — Medication 1 MILLIGRAM(S): at 14:29

## 2021-12-26 RX ADMIN — FAMOTIDINE 20 MILLIGRAM(S): 10 INJECTION INTRAVENOUS at 20:50

## 2021-12-26 RX ADMIN — Medication 80 MILLIGRAM(S): at 20:25

## 2021-12-26 RX ADMIN — Medication 40 MILLIGRAM(S): at 18:42

## 2021-12-26 RX ADMIN — Medication 90 MILLIGRAM(S): at 18:03

## 2021-12-26 RX ADMIN — Medication 20 MILLIGRAM(S): at 15:17

## 2021-12-26 NOTE — PROGRESS NOTE ADULT - ASSESSMENT
26y  at 30w (EDC 3/6) transferred from [facility] for sPEC diagnosed at 29w4d. Patient is s/p Labetalol 20/40/80 IVP while at [facility], currently on Procardia 60XL for BP control. BPs normal range overnight. NST nonreactive overnight s/p BPP .     #sPEC   - s/p Magnesium for seizure prophylaxis (-)  - s/p betamethasone for fetal lung maturity ()  - c/w Procardia 60 XL QD for BP control    #Fetal well-being  - BID NST  - For ATU sono   - c/w PNV  - NICU consult  - f/u GBS result, sent     #Maternal Well-being  - Regular diet  - SLIV  - HSQ 5k TID, SCDs, ambulation for DVT prophylaxis    #MOD   - Fetus currently in breech presentation    - Anticipate delivery at 34w unless deterioration in maternal or fetal status    -  consents signed, in chart    C. Diamant, PGY-3

## 2021-12-26 NOTE — PROGRESS NOTE ADULT - ASSESSMENT
Ms. Briggs is a 27 yo  at 30 wks gestation admitted with preeclampsia with severe features, clinically stable at this time    1. Preeclampsia with severe features  - S/p IV Labetalol 20/40/80 at outside hospital for severe range blood pressures  - Blood pressures currently 140s-150s/70s-90s on Procardia 60 mg XL  - Patient asymptomatic   - S/p Mag for seizure prophylaxis -    2. Fetus  -   - FHR was nonreactive over night, BPP 8/10.   - Overall reassuring fetal status  - S/p Betamethasone - for fetal lung maturity  - Breech presentation    Continue current inpatient management. Delivery indicated by 34 wks gestation, sooner if any changes in clinical status. Patient seen and counseled with Dr. Monterroso and Dr. Valentín Nava MD  Fellow, Maternal Fetal Medicine

## 2021-12-26 NOTE — OB PROVIDER DELIVERY SUMMARY - NSPROVIDERDELIVERYNOTE_OBGYN_ALL_OB_FT
Procedure: pLTCS  Preop Dx: (1) sPEC (2) morbid obesity (3)  30w0d  QBL: 497ml  IVF:  1.5L crystalloids  UOP: 500ml  Layers of uterine closure: two    Complications: none  Specimen: placenta  Findings: normal appearing uterus and bilateral fallopian tubes and ovaries  Hemostatic/Intraoperative agents: Surgicel powder over repaired hysterotomy  Baby: Male, Transverse (back up), Apgars 7/9, 1006g    Cynthia Pearce MD  OBGYN PGY3

## 2021-12-26 NOTE — CHART NOTE - NSCHARTNOTEFT_GEN_A_CORE
R3 Note R3 Note    Patient transferred from [facility] to L&D Pacu due to multiple severe range blood pressures.  Patient with intermittent severe range BPs with 15 min repeat in mild range however, more recently with back to back severes requiring Procardia 10 IR and labetalol 20 IV push.  Decision made to bring patient to PACU for closer blood pressure monitoring and fetal monitoring.  Patient asymptomatic on evaluation.  Denies headache, visual disturbance, RUQ pain, SOB.      Vital Signs Last 24 Hrs  T(C): 36.9 (26 Dec 2021 17:59), Max: 37.3 (26 Dec 2021 14:02)  T(F): 98.42 (26 Dec 2021 17:59), Max: 99.1 (26 Dec 2021 14:02)  HR: 81 (26 Dec 2021 19:13) (75 - 95)  BP: 153/72 (26 Dec 2021 19:13) (124/63 - 190/93)  BP(mean): --  RR: 17 (26 Dec 2021 05:31) (17 - 18)  SpO2: 98% (26 Dec 2021 15:30) (97% - 100%)    FHT: baseline 140, mod [place], +accels, sporadic spontaneous prolonged deceleration  [place]: no ctx  TAUS: transverse presentation, anterior placenta               13.0   10.47 )-----------( 339      (  @ 16:31 )             41.1      @ 16:31    136  |  103  |  18  ----------------------------<  85  4.6   |  21  |  0.58    Ca    9.1       @ 16:31    TPro  6.0  /  Alb  3.4  /  TBili  <0.2  /  DBili  x   /  AST  17  /  ALT  20  /  AlkPhos  87   @ 16:31    PT/INR - (  @ 16:31 )   PT: 10.0 sec;   INR: <0.90 ratio    PTT - (  @ 16:31 )  PTT:27.4 sec    Uric Acid: ( @ 16:31)  4.7      Fibrinogen: ( @ 16:31)  416      LDH: ( @ 16:31)  213      A/P: 25 yo  at 30 wks gestational age transferred from [facility] for newly diagnosed sPEC requiring medication titration with increase in severe range blood pressures.    -s/p Procardia 10 IR, labetalol 20 IV push  -HELLP labs repeated this afternoon with change in clinical status- grossly wnl  -continue cycling BPs q15 min  -continuous fetal monitoring to assess fetal status  -NPO  -increase standing PO medication to Procardia 90XL  -Will monitor BPs with goal to prolong pregnancy however delivery may be indicated in the case of worsening sPEC and continued severe range pressures    d/w Dr. Valentín Estrada PGY3

## 2021-12-26 NOTE — OB RN INTRAOPERATIVE NOTE - NSSELHIDDEN_OBGYN_ALL_OB_FT
[[email or web address]_OB_FT:[email or web address]],[NS_[email or web address]_ALL_OB_FT:[id number]=],[NS_DeliveryRN_OBGYN_ALL_OB_FT:[email or web address]=]

## 2021-12-26 NOTE — OB PROVIDER DELIVERY SUMMARY - NS_DELIVERYATTENDING1_OBGYN_ALL_OB_FT
Genaro Bass MD Pt tripped over lip of gate striking her rt arm, side, hip and knee.  Pt denies head trauma, denies loc.  Pt is a/ox4, states she can ambulate and weight bare.

## 2021-12-26 NOTE — OB NEONATOLOGY/PEDIATRICIAN DELIVERY SUMMARY - NSPEDSNEONOTESA_OBGYN_ALL_OB_FT
Pediatrics called to delivery for birth of 30.0 wk male born via unscheduled pCS to a 25 y/o  mother. Maternal history notable for obesity (BMI 50.2). Prenatal history notable for sPEC s/p Mag, labetalol, and Procardia. Beta x1 and Mag given on . Maternal labs include Blood Type B- s/p Rhogam , HIV -, RPR -, rubella immune, Hep B[-], and GBS pos on . ROM at delivery with clear/meconium fluids. Baby emerged with fair tone and crying, was placed in  was w/d/s/s with APGARS of **/** .     Mom plans to initiate breastfeeding and formula feeding, consents to Hep B vaccine, and declines circ. EOS N/A, no rupture or labor prior to delivery. Maternal COVID status negative. Pediatrics called to delivery for birth of 30.0 wk male born via unscheduled pCS to a 27 y/o  mother. Maternal history notable for obesity (BMI 50.2). Prenatal history notable for sPEC s/p Mag, labetalol, and Procardia. Beta x1 and Mag given on . Maternal labs include Blood Type B- s/p Rhogam on , HIV -, RPR -, rubella immune, Hep B[-], and GBS pos on . ROM at delivery with clear fluids. Baby emerged with fair tone and crying, was placed in bag and on warming mattress. Was warmed, dried, sucked, and stimulated with APGARs of 7/9. At 1-MOL started on CPAP 5/30%, good respiratory efforts and sats at target. Transitioned to nCPAP and transferred to NICU in stable condition. Mom plans to initiate breastfeeding and formula feeding, consents to Hep B vaccine, and declines circ. EOS N/A, no rupture or labor prior to delivery. Maternal COVID status negative. Attending Dr. Zamarripa was present for delivery. Pediatrics called to delivery for birth of 30.0 wk male born via unscheduled pCS to a 27 y/o  mother. Maternal history notable for obesity (BMI 50.2). Prenatal history notable for sPEC s/p Mag, labetalol, and Procardia. Beta x1 and Mag given on . Maternal labs include Blood Type B- s/p Rhogam on , HIV -, RPR -, rubella immune, Hep B[-], and GBS pos on . ROM at delivery with clear fluids. Baby emerged with fair tone and crying, was placed in bag and on warming mattress. Was warmed, dried, sucked, and stimulated with APGARs of 7/9. Baby emerged in breech position. At 1-MOL started on CPAP 5/30%, good respiratory efforts and sats at target. Transitioned to nCPAP and transferred to NICU in stable condition. Mom plans to initiate breastfeeding and formula feeding, consents to Hep B vaccine, and declines circ. EOS N/A, no rupture or labor prior to delivery. Maternal COVID status negative. Attending Dr. Zamarripa was present for delivery. Pediatrics called to delivery for birth of 30.0 wk male born via unscheduled pCS to a 25 y/o  mother. Maternal history notable for obesity (BMI 50.2). Prenatal history notable for sPEC s/p Mag, labetalol, and Procardia. Beta x1 and Mag given on . Maternal labs include Blood Type B- s/p Rhogam on , HIV -, RPR -, rubella immune, Hep B[-], and GBS pos on . ROM at delivery with clear fluids. Baby emerged with fair tone and crying, was placed in bag and on warming mattress. Was warmed, dried, suctioned, and stimulated with APGARs of 7/9. Baby emerged in breech position. At 1-MOL started on CPAP 5/30%, good respiratory efforts and sats at target. Transitioned to nCPAP and transferred to NICU in stable condition. Mom plans to initiate breastfeeding and formula feeding, consents to Hep B vaccine, and declines circ. EOS N/A, no rupture or labor prior to delivery. Maternal COVID status negative. Attending Dr. Zamarripa was present for delivery. Pediatrics called to delivery for birth of 30.0 wk male born via unscheduled pCS to a 27 y/o  mother. Maternal history notable for obesity (BMI 50.2). Prenatal history notable for sPEC s/p Mag, labetalol, and Procardia. Beta x1 and Mag given on . Maternal labs include Blood Type B- s/p Rhogam on , HIV -, RPR -, rubella immune, Hep B[-], and GBS pos on . ROM at delivery with clear fluids. Baby emerged with fair tone and crying, was placed in bag and on warming mattress. Was warmed, dried, suctioned, and stimulated with APGARs of 7/9. Baby emerged in transverse, back up, position. At 1-MOL started on CPAP 5/30%, good respiratory efforts and sats at target. Transitioned to nCPAP and transferred to NICU in stable condition. Mom plans to initiate breastfeeding and formula feeding, consents to Hep B vaccine, and declines circ. EOS N/A, no rupture or labor prior to delivery. Maternal COVID status negative. Attending Dr. Zamarripa was present for delivery. Pediatrics called to delivery for birth of 30.0 wk male born via unscheduled pCS to a 27 y/o  mother. Maternal history notable for obesity (BMI 50.2). Prenatal history notable for sPEC s/p Mag, labetalol, and Procardia. Beta x1 and Mag given on . Maternal labs include Blood Type B- s/p Rhogam on , HIV -, RPR -, rubella immune, Hep B[-], and GBS pos on . ROM at delivery with clear fluids. Baby emerged with fair tone and crying, was placed in bag and on warming mattress. Was warmed, dried, suctioned, and stimulated with APGARs of 7/9. Baby emerged in transverse, back up, position. At 1-MOL started on CPAP 5/30%, good respiratory efforts and sats at target. Transitioned to nCPAP and transferred to NICU in stable condition. Mom plans to initiate breastfeeding and formula feeding, consents to Hep B vaccine, and declines circ. EOS N/A, no rupture or labor prior to delivery. Maternal COVID status negative. Attending Dr. Zamarripa was present for delivery. NICU fellow and ACP Deborah Rivas also present at delivery. Temperature prior to transfer to NICU 36.6 C.

## 2021-12-26 NOTE — OB PROVIDER DELIVERY SUMMARY - NSSELHIDDEN_OBGYN_ALL_OB_FT
[NS_DeliveryAttending1_OBGYN_ALL_OB_FT:[email or web address]=],[NS_DeliveryAssist1_OBGYN_ALL_OB_FT:[id number]=],[NS_DeliveryRN_OBGYN_ALL_OB_FT:[email or web address]=]

## 2021-12-26 NOTE — CHART NOTE - NSCHARTNOTEFT_GEN_A_CORE
Called about patient's /93 s/p 20 mg IVP of labetalol and 90 mg of Procardia.    Another 40 mg IVP of labetalol recommended and Magsulfate for seizure prophylaxis.    Discussed with the patient and  that considering difficulty with BP control in the setting of severe PEC, delivery is recommended.    The patient and  verbalized understanding and agreement with the plan.

## 2021-12-26 NOTE — CONSULT NOTE ADULT - SUBJECTIVE AND OBJECTIVE BOX
Ms. Briggs is a 25 y/o  admitted at 30w0d gestational age. Maternal history notable for ovesity, and prenatal history notable for sPEC. Most recent EFW 815g on . NICU consulted to discuss what to expect if she were to deliver at 30 weeks gestation.    I met with Ms. Briggs and her partner and we reviewed the followin. The NICU team will be present at her delivery and will immediately assess and care for her infant.    2. The infant will likely require respiratory support, most commonly in the form of nasal CPAP. The outcomes improve after  steroids. Some infants at this gestational age may require intubation and mechanical ventilation.    3. Depending on the clinical status of the infant, enteral feedings will likely not be started immediately. IV nutrition in the form of TPN would be provided via umbilical line or PICC until the infant is able to tolerate full enteral feedings. Due to immature suck/swallow, the infant may require an orogastric tube once feeds are initiated. The infant is also at risk for hypoglycemia.    4. Discussed the benefits of breastfeeding in  infants and encouraged mother to pump following delivery.    5. Due to prematurity, the infant will be at increased risk for infection and would likely be started on antibiotics after birth. The infant will be screened for infections following delivery and may require other courses of antibiotics during their hospital course if an infection were suspected. If the infant shows signs and symptoms of feeding intolerance, feedings may be held, and the infant may require evaluation for intestinal infection (necrotizing enterocolitis).    6. Risk of symptomatic PDA discussed with possible need for medical vs procedural closure.    7. Risk of intraventricular hemorrhage (IVH) and possible consequences discussed.    8. Retinopathy of prematurity (ROP) risk discussed along with close follow up with ophthalmologist. If ROP is significant, medical or surgical treatment may be required.    9. The infant is at risk for developmental delays as a consequence of prematurity. The infant will be evaluated by a developmental pediatrician to monitor for neurodevelopmental delays.    10. Elevated risk of jaundice and possible requirement for phototherapy discussed.    11. Thermoregulation issues and need to be in an isolette with slow weaning to a crib discussed.    12. Length of stay is highly variable, but given the infant’s size gestational age, average stay is approximately 7-9 weeks. Discussed discharged criteria.    Ms. Briggs had the chance to ask any questions and was encouraged to contact the NICU at that time if additional questions arise.    Thank you for the opportunity to participate in the care of this patient and please inform us of any changes in her status.

## 2021-12-26 NOTE — OB RN DELIVERY SUMMARY - NSSELHIDDEN_OBGYN_ALL_OB_FT
[NS_[email or web address]_ALL_OB_FT:[email or web address]=],[NS_DeliveryAssist1_OBGYN_ALL_OB_FT:[id number]=],[NS_DeliveryRN_OBGYN_ALL_OB_FT:[email or web address]=]

## 2021-12-27 DIAGNOSIS — Z34.93 ENCOUNTER FOR SUPERVISION OF NORMAL PREGNANCY, UNSPECIFIED, THIRD TRIMESTER: ICD-10-CM

## 2021-12-27 DIAGNOSIS — Z3A.29 29 WEEKS GESTATION OF PREGNANCY: ICD-10-CM

## 2021-12-27 LAB
ALBUMIN SERPL ELPH-MCNC: 3 G/DL — LOW (ref 3.3–5)
ALBUMIN SERPL ELPH-MCNC: 3.2 G/DL — LOW (ref 3.3–5)
ALP SERPL-CCNC: 72 U/L — SIGNIFICANT CHANGE UP (ref 40–120)
ALP SERPL-CCNC: 78 U/L — SIGNIFICANT CHANGE UP (ref 40–120)
ALT FLD-CCNC: 17 U/L — SIGNIFICANT CHANGE UP (ref 4–33)
ALT FLD-CCNC: 20 U/L — SIGNIFICANT CHANGE UP (ref 4–33)
ANION GAP SERPL CALC-SCNC: 11 MMOL/L — SIGNIFICANT CHANGE UP (ref 7–14)
ANION GAP SERPL CALC-SCNC: 11 MMOL/L — SIGNIFICANT CHANGE UP (ref 7–14)
ANION GAP SERPL CALC-SCNC: 12 MMOL/L — SIGNIFICANT CHANGE UP (ref 7–14)
ANISOCYTOSIS BLD QL: SLIGHT — SIGNIFICANT CHANGE UP
APTT BLD: 25.5 SEC — LOW (ref 27–36.3)
APTT BLD: 26.1 SEC — LOW (ref 27–36.3)
AST SERPL-CCNC: 18 U/L — SIGNIFICANT CHANGE UP (ref 4–32)
AST SERPL-CCNC: 19 U/L — SIGNIFICANT CHANGE UP (ref 4–32)
BASOPHILS # BLD AUTO: 0 K/UL — SIGNIFICANT CHANGE UP (ref 0–0.2)
BASOPHILS # BLD AUTO: 0.03 K/UL — SIGNIFICANT CHANGE UP (ref 0–0.2)
BASOPHILS NFR BLD AUTO: 0 % — SIGNIFICANT CHANGE UP (ref 0–2)
BASOPHILS NFR BLD AUTO: 0.2 % — SIGNIFICANT CHANGE UP (ref 0–2)
BILIRUB SERPL-MCNC: 0.2 MG/DL — SIGNIFICANT CHANGE UP (ref 0.2–1.2)
BILIRUB SERPL-MCNC: <0.2 MG/DL — SIGNIFICANT CHANGE UP (ref 0.2–1.2)
BUN SERPL-MCNC: 17 MG/DL — SIGNIFICANT CHANGE UP (ref 7–23)
BUN SERPL-MCNC: 18 MG/DL — SIGNIFICANT CHANGE UP (ref 7–23)
BUN SERPL-MCNC: 19 MG/DL — SIGNIFICANT CHANGE UP (ref 7–23)
CALCIUM SERPL-MCNC: 7.4 MG/DL — LOW (ref 8.4–10.5)
CALCIUM SERPL-MCNC: 7.8 MG/DL — LOW (ref 8.4–10.5)
CALCIUM SERPL-MCNC: 8.2 MG/DL — LOW (ref 8.4–10.5)
CHLORIDE SERPL-SCNC: 100 MMOL/L — SIGNIFICANT CHANGE UP (ref 98–107)
CHLORIDE SERPL-SCNC: 97 MMOL/L — LOW (ref 98–107)
CHLORIDE SERPL-SCNC: 98 MMOL/L — SIGNIFICANT CHANGE UP (ref 98–107)
CO2 SERPL-SCNC: 20 MMOL/L — LOW (ref 22–31)
CO2 SERPL-SCNC: 21 MMOL/L — LOW (ref 22–31)
CO2 SERPL-SCNC: 23 MMOL/L — SIGNIFICANT CHANGE UP (ref 22–31)
CREAT SERPL-MCNC: 0.59 MG/DL — SIGNIFICANT CHANGE UP (ref 0.5–1.3)
CREAT SERPL-MCNC: 0.63 MG/DL — SIGNIFICANT CHANGE UP (ref 0.5–1.3)
CREAT SERPL-MCNC: 0.64 MG/DL — SIGNIFICANT CHANGE UP (ref 0.5–1.3)
EOSINOPHIL # BLD AUTO: 0 K/UL — SIGNIFICANT CHANGE UP (ref 0–0.5)
EOSINOPHIL # BLD AUTO: 0 K/UL — SIGNIFICANT CHANGE UP (ref 0–0.5)
EOSINOPHIL NFR BLD AUTO: 0 % — SIGNIFICANT CHANGE UP (ref 0–6)
EOSINOPHIL NFR BLD AUTO: 0 % — SIGNIFICANT CHANGE UP (ref 0–6)
FIBRINOGEN PPP-MCNC: 320 MG/DL — SIGNIFICANT CHANGE UP (ref 290–520)
FIBRINOGEN PPP-MCNC: 411 MG/DL — SIGNIFICANT CHANGE UP (ref 290–520)
GIANT PLATELETS BLD QL SMEAR: PRESENT — SIGNIFICANT CHANGE UP
GLUCOSE SERPL-MCNC: 101 MG/DL — HIGH (ref 70–99)
GLUCOSE SERPL-MCNC: 86 MG/DL — SIGNIFICANT CHANGE UP (ref 70–99)
GLUCOSE SERPL-MCNC: 94 MG/DL — SIGNIFICANT CHANGE UP (ref 70–99)
HCT VFR BLD CALC: 37.1 % — SIGNIFICANT CHANGE UP (ref 34.5–45)
HCT VFR BLD CALC: 39.5 % — SIGNIFICANT CHANGE UP (ref 34.5–45)
HGB BLD-MCNC: 12.1 G/DL — SIGNIFICANT CHANGE UP (ref 11.5–15.5)
HGB BLD-MCNC: 12.6 G/DL — SIGNIFICANT CHANGE UP (ref 11.5–15.5)
IANC: 14.58 K/UL — HIGH (ref 1.5–8.5)
IANC: 18.75 K/UL — HIGH (ref 1.5–8.5)
IMM GRANULOCYTES NFR BLD AUTO: 0.7 % — SIGNIFICANT CHANGE UP (ref 0–1.5)
INR BLD: <0.9 RATIO — LOW (ref 0.88–1.16)
INR BLD: <0.9 RATIO — SIGNIFICANT CHANGE UP (ref 0.88–1.16)
KLEIHAUER-BETKE CALCULATION: 0.02 % — SIGNIFICANT CHANGE UP
LDH SERPL L TO P-CCNC: 222 U/L — SIGNIFICANT CHANGE UP (ref 135–225)
LDH SERPL L TO P-CCNC: 232 U/L — HIGH (ref 135–225)
LYMPHOCYTES # BLD AUTO: 1.59 K/UL — SIGNIFICANT CHANGE UP (ref 1–3.3)
LYMPHOCYTES # BLD AUTO: 11.9 % — LOW (ref 13–44)
LYMPHOCYTES # BLD AUTO: 2.13 K/UL — SIGNIFICANT CHANGE UP (ref 1–3.3)
LYMPHOCYTES # BLD AUTO: 7.2 % — LOW (ref 13–44)
MACROCYTES BLD QL: SLIGHT — SIGNIFICANT CHANGE UP
MAGNESIUM SERPL-MCNC: 5.2 MG/DL — HIGH (ref 1.6–2.6)
MAGNESIUM SERPL-MCNC: 5.7 MG/DL — HIGH (ref 1.6–2.6)
MAGNESIUM SERPL-MCNC: 5.8 MG/DL — HIGH (ref 1.6–2.6)
MCHC RBC-ENTMCNC: 28.5 PG — SIGNIFICANT CHANGE UP (ref 27–34)
MCHC RBC-ENTMCNC: 28.8 PG — SIGNIFICANT CHANGE UP (ref 27–34)
MCHC RBC-ENTMCNC: 31.9 GM/DL — LOW (ref 32–36)
MCHC RBC-ENTMCNC: 32.6 GM/DL — SIGNIFICANT CHANGE UP (ref 32–36)
MCV RBC AUTO: 87.5 FL — SIGNIFICANT CHANGE UP (ref 80–100)
MCV RBC AUTO: 90.2 FL — SIGNIFICANT CHANGE UP (ref 80–100)
MONOCYTES # BLD AUTO: 0.2 K/UL — SIGNIFICANT CHANGE UP (ref 0–0.9)
MONOCYTES # BLD AUTO: 1.06 K/UL — HIGH (ref 0–0.9)
MONOCYTES NFR BLD AUTO: 0.9 % — LOW (ref 2–14)
MONOCYTES NFR BLD AUTO: 5.9 % — SIGNIFICANT CHANGE UP (ref 2–14)
MYELOCYTES NFR BLD: 0.9 % — HIGH (ref 0–0)
NEUTROPHILS # BLD AUTO: 14.58 K/UL — HIGH (ref 1.8–7.4)
NEUTROPHILS # BLD AUTO: 19.95 K/UL — HIGH (ref 1.8–7.4)
NEUTROPHILS NFR BLD AUTO: 81.3 % — HIGH (ref 43–77)
NEUTROPHILS NFR BLD AUTO: 88.3 % — HIGH (ref 43–77)
NEUTS BAND # BLD: 1.8 % — SIGNIFICANT CHANGE UP (ref 0–6)
NRBC # BLD: 0 /100 WBCS — SIGNIFICANT CHANGE UP
NRBC # FLD: 0 K/UL — SIGNIFICANT CHANGE UP
PLAT MORPH BLD: ABNORMAL
PLATELET # BLD AUTO: 341 K/UL — SIGNIFICANT CHANGE UP (ref 150–400)
PLATELET # BLD AUTO: 352 K/UL — SIGNIFICANT CHANGE UP (ref 150–400)
PLATELET COUNT - ESTIMATE: NORMAL — SIGNIFICANT CHANGE UP
POTASSIUM SERPL-MCNC: 5 MMOL/L — SIGNIFICANT CHANGE UP (ref 3.5–5.3)
POTASSIUM SERPL-MCNC: 5.4 MMOL/L — HIGH (ref 3.5–5.3)
POTASSIUM SERPL-MCNC: 5.4 MMOL/L — HIGH (ref 3.5–5.3)
POTASSIUM SERPL-SCNC: 5 MMOL/L — SIGNIFICANT CHANGE UP (ref 3.5–5.3)
POTASSIUM SERPL-SCNC: 5.4 MMOL/L — HIGH (ref 3.5–5.3)
POTASSIUM SERPL-SCNC: 5.4 MMOL/L — HIGH (ref 3.5–5.3)
PROT SERPL-MCNC: 5.4 G/DL — LOW (ref 6–8.3)
PROT SERPL-MCNC: 5.8 G/DL — LOW (ref 6–8.3)
PROTHROM AB SERPL-ACNC: 10 SEC — LOW (ref 10.6–13.6)
PROTHROM AB SERPL-ACNC: 10.3 SEC — LOW (ref 10.6–13.6)
RBC # BLD: 4.24 M/UL — SIGNIFICANT CHANGE UP (ref 3.8–5.2)
RBC # BLD: 4.38 M/UL — SIGNIFICANT CHANGE UP (ref 3.8–5.2)
RBC # FLD: 13.8 % — SIGNIFICANT CHANGE UP (ref 10.3–14.5)
RBC # FLD: 14 % — SIGNIFICANT CHANGE UP (ref 10.3–14.5)
RBC BLD AUTO: ABNORMAL
SODIUM SERPL-SCNC: 128 MMOL/L — LOW (ref 135–145)
SODIUM SERPL-SCNC: 132 MMOL/L — LOW (ref 135–145)
SODIUM SERPL-SCNC: 133 MMOL/L — LOW (ref 135–145)
URATE SERPL-MCNC: 4.7 MG/DL — SIGNIFICANT CHANGE UP (ref 2.5–7)
URATE SERPL-MCNC: 4.8 MG/DL — SIGNIFICANT CHANGE UP (ref 2.5–7)
VARIANT LYMPHS # BLD: 0.9 % — SIGNIFICANT CHANGE UP (ref 0–6)
WBC # BLD: 17.93 K/UL — HIGH (ref 3.8–10.5)
WBC # BLD: 22.14 K/UL — HIGH (ref 3.8–10.5)
WBC # FLD AUTO: 17.93 K/UL — HIGH (ref 3.8–10.5)
WBC # FLD AUTO: 22.14 K/UL — HIGH (ref 3.8–10.5)

## 2021-12-27 RX ORDER — OXYCODONE HYDROCHLORIDE 5 MG/1
5 TABLET ORAL
Refills: 0 | Status: DISCONTINUED | OUTPATIENT
Start: 2021-12-27 | End: 2021-12-30

## 2021-12-27 RX ORDER — MAGNESIUM SULFATE 500 MG/ML
2 VIAL (ML) INJECTION
Qty: 40 | Refills: 0 | Status: DISCONTINUED | OUTPATIENT
Start: 2021-12-27 | End: 2021-12-27

## 2021-12-27 RX ORDER — FOLIC ACID 0.8 MG
1 TABLET ORAL DAILY
Refills: 0 | Status: DISCONTINUED | OUTPATIENT
Start: 2021-12-27 | End: 2021-12-30

## 2021-12-27 RX ORDER — ENOXAPARIN SODIUM 100 MG/ML
40 INJECTION SUBCUTANEOUS DAILY
Refills: 0 | Status: DISCONTINUED | OUTPATIENT
Start: 2021-12-27 | End: 2021-12-27

## 2021-12-27 RX ORDER — HEPARIN SODIUM 5000 [USP'U]/ML
10000 INJECTION INTRAVENOUS; SUBCUTANEOUS EVERY 12 HOURS
Refills: 0 | Status: DISCONTINUED | OUTPATIENT
Start: 2021-12-27 | End: 2021-12-30

## 2021-12-27 RX ORDER — SODIUM CHLORIDE 9 MG/ML
1000 INJECTION INTRAMUSCULAR; INTRAVENOUS; SUBCUTANEOUS
Refills: 0 | Status: DISCONTINUED | OUTPATIENT
Start: 2021-12-27 | End: 2021-12-30

## 2021-12-27 RX ORDER — HYDRALAZINE HCL 50 MG
5 TABLET ORAL ONCE
Refills: 0 | Status: COMPLETED | OUTPATIENT
Start: 2021-12-27 | End: 2021-12-27

## 2021-12-27 RX ORDER — NIFEDIPINE 30 MG
90 TABLET, EXTENDED RELEASE 24 HR ORAL DAILY
Refills: 0 | Status: DISCONTINUED | OUTPATIENT
Start: 2021-12-27 | End: 2021-12-30

## 2021-12-27 RX ORDER — MAGNESIUM SULFATE 500 MG/ML
2 VIAL (ML) INJECTION
Qty: 40 | Refills: 0 | Status: DISCONTINUED | OUTPATIENT
Start: 2021-12-27 | End: 2021-12-28

## 2021-12-27 RX ADMIN — OXYCODONE HYDROCHLORIDE 5 MILLIGRAM(S): 5 TABLET ORAL at 22:11

## 2021-12-27 RX ADMIN — Medication 975 MILLIGRAM(S): at 21:28

## 2021-12-27 RX ADMIN — Medication 30 MILLIGRAM(S): at 08:35

## 2021-12-27 RX ADMIN — Medication 30 MILLIGRAM(S): at 17:06

## 2021-12-27 RX ADMIN — HEPARIN SODIUM 10000 UNIT(S): 5000 INJECTION INTRAVENOUS; SUBCUTANEOUS at 06:35

## 2021-12-27 RX ADMIN — Medication 975 MILLIGRAM(S): at 13:11

## 2021-12-27 RX ADMIN — HEPARIN SODIUM 10000 UNIT(S): 5000 INJECTION INTRAVENOUS; SUBCUTANEOUS at 17:04

## 2021-12-27 RX ADMIN — OXYCODONE HYDROCHLORIDE 5 MILLIGRAM(S): 5 TABLET ORAL at 21:28

## 2021-12-27 RX ADMIN — Medication 90 MILLIGRAM(S): at 17:04

## 2021-12-27 RX ADMIN — Medication 50 GM/HR: at 01:59

## 2021-12-27 RX ADMIN — Medication 975 MILLIGRAM(S): at 05:52

## 2021-12-27 RX ADMIN — Medication 975 MILLIGRAM(S): at 22:11

## 2021-12-27 RX ADMIN — Medication 975 MILLIGRAM(S): at 06:59

## 2021-12-27 RX ADMIN — Medication 5 MILLIGRAM(S): at 01:22

## 2021-12-27 RX ADMIN — Medication 1000 MILLIUNIT(S)/MIN: at 01:59

## 2021-12-27 RX ADMIN — Medication 1 MILLIGRAM(S): at 13:11

## 2021-12-27 RX ADMIN — Medication 1 TABLET(S): at 13:11

## 2021-12-27 RX ADMIN — Medication 975 MILLIGRAM(S): at 14:11

## 2021-12-27 RX ADMIN — Medication 50 GM/HR: at 08:52

## 2021-12-27 RX ADMIN — Medication 50 GM/HR: at 07:33

## 2021-12-27 NOTE — PROGRESS NOTE ADULT - ASSESSMENT
Ms. Briggs is a 26y  POD#1 status post pLTCS at 30w gestation for sPEC.    #Neuro: pain well controlled w/ current regimen  #CV: Hemodynamically stable, H/H and HELLP wnl, f/u AM labs   #Pulm: ROS and PE unremarkable, pt encouraged to use incentive spirometer  #ID: Afebrile, no signs or sx of infection  #GI: Regular diet ordered  #: Reed in place, UOP adequate  #FEN: LR @50, Mg @50.  d/c Mag  @ 930p  #Heme: HSQ, SCDs while in bed, and early ambulation encouraged for DVT prophylaxis    Disposition: continue routine post-op care      Cynthia Pearce MD  OBGYN PGY3    Ms. Briggs is a 26y  POD#1 status post pLTCS at 30w gestation for sPEC (persistent severe range BPs).    #Neuro: pain well controlled w/ current regimen  #CV: Hemodynamically stable, H/H and HELLP wnl, f/u AM labs. BP well controlled currently on Nifedipine 90mg XL status post Lab 20/20/40/80 () and Hydral 5 ()  #Pulm: ROS and PE unremarkable, pt encouraged to use incentive spirometer  #ID: Afebrile, no signs or sx of infection  #GI: Regular diet ordered  #: Reed in place, UOP adequate  #FEN: LR @50, Mg @50.  d/c Mag  @ 930p  #Heme: HSQ, SCDs while in bed, and early ambulation encouraged for DVT prophylaxis    Disposition: continue routine post-op care      Cynthia Pearce MD  OBGYN PGY3

## 2021-12-27 NOTE — CHART NOTE - NSCHARTNOTEFT_GEN_A_CORE
R1 Note VS/Labs Reviewed    Pt PPD#1 s/p pCS sPEC on magnesium, noted to be hyponatremic, hyperkalemic. Pt asymptomatic at this time.    Vital Signs Last 24 Hrs  T(C): 36.6 (27 Dec 2021 11:10), Max: 37.3 (26 Dec 2021 14:02)  T(F): 97.9 (27 Dec 2021 11:10), Max: 99.1 (26 Dec 2021 14:02)  HR: 80 (27 Dec 2021 11:10) (67 - 95)  BP: 139/73 (27 Dec 2021 11:10) (120/66 - 190/93)  BP(mean): 78 (27 Dec 2021 07:50) (67 - 122)  RR: 18 (27 Dec 2021 11:10) (0 - 24)  SpO2: 97% (27 Dec 2021 11:10) (93% - 100%)    I&O's Detail    26 Dec 2021 07:01  -  27 Dec 2021 07:00  --------------------------------------------------------  IN:    Lactated Ringers: 250 mL    Magnesium Sulfate: 400 mL    Other (mL): 1500 mL    Oxytocin: 200 mL  Total IN: 2350 mL    OUT:    Blood Loss (mL): 497 mL    Indwelling Catheter - Urethral (mL): 456 mL    Other (mL): 500 mL  Total OUT: 1453 mL    Total NET: 897 mL      27 Dec 2021 07:01  -  27 Dec 2021 11:56  --------------------------------------------------------  IN:    Lactated Ringers: 150 mL    Magnesium Sulfate: 150 mL  Total IN: 300 mL    OUT:    Indwelling Catheter - Urethral (mL): 300 mL  Total OUT: 300 mL  Total NET: 0 mL                        12.1   17.93 )-----------( 341      ( 27 Dec 2021 08:05 )             37.1     12-27    128<L>  |  97<L>  |  19  ----------------------------<  94  5.4<H>   |  20<L>  |  0.59    Ca    7.4<L>      27 Dec 2021 08:05  Mg     5.70     12-27    TPro  5.4<L>  /  Alb  3.0<L>  /  TBili  0.2  /  DBili  x   /  AST  18  /  ALT  20  /  AlkPhos  72  12-27      LIVER FUNCTIONS - ( 27 Dec 2021 08:05 )  Alb: 3.0 g/dL / Pro: 5.4 g/dL / ALK PHOS: 72 U/L / ALT: 20 U/L / AST: 18 U/L / GGT: x           PT/INR - ( 27 Dec 2021 08:05 )   PT: 10.3 sec;   INR: <0.90 ratio      PTT - ( 27 Dec 2021 08:05 )  PTT:25.5 sec    Plan:  -Repeat BMP at noon  -Change maintenance IVF to NS from LR  -continue to monitor closely  D/w Dr. Tommie Palma PGY1

## 2021-12-27 NOTE — PACU DISCHARGE NOTE - PAIN:
Please give the patient the [employer]at[employer] Sheet(s) entitled Skin Cancer (Please [place]  Basal Cell Carcinoma in the sheet).      DERMATOLOGY  SHAVE BIOPSY WOUND CARE      You may shower immediately following the procedure.    Wound care for 4-5 days:  • Wash your hands with soap and water.  • Wash area gently one time daily with soap and water.  • Dry area with clean towel.  • Apply Vaseline and a Band-Aid, if needed.    Notify the clinic if any of the following occur:  • Increased redness or swelling that continues to spread.  • Pus draining from wound.  • Fever, chills, flu-like symptoms.  • Bleeding that persists despite FIRM PRESSURE applied for 2 sessions of 15 CONTINUOUS minutes, NO PEEKING!!    What if I am having some discomfort?  Following minor procedure, the discomfort is usually mild.  You may use Tylenol, Extra Strength Tylenol, Motrin, Aleve, Naproxen or Ibuprofen.    If you are already taking daily aspirin or other blood thinners, you do NOT have to discontinue your daily use.      For any concerns, call the Dermatology clinic at:  • [phone], during business hours Monday-Friday  • [phone] evenings after 5pm, weekends, holidays  OR  If you have not received your biopsy results within 2 weeks after the procedure.      (rev 9/14)         Controlled with current regime

## 2021-12-27 NOTE — PROVIDER CONTACT NOTE (OTHER) - ASSESSMENT
BP: 160/82 HR: 71 RR: 22 O2sat: 99% in RA T: 36.5 IVF: 1500cc received 500cc bolus in OR; UOP: 500cc on postpartum pit 50ml/hr and mag 2gram/hr; hourly goal of uop: 64cc with 1st hr at 60cc; 2nd hr at 25cc

## 2021-12-28 ENCOUNTER — TRANSCRIPTION ENCOUNTER (OUTPATIENT)
Age: 26
End: 2021-12-28

## 2021-12-28 RX ORDER — LABETALOL HCL 100 MG
200 TABLET ORAL
Refills: 0 | Status: DISCONTINUED | OUTPATIENT
Start: 2021-12-28 | End: 2021-12-30

## 2021-12-28 RX ORDER — IBUPROFEN 200 MG
600 TABLET ORAL EVERY 6 HOURS
Refills: 0 | Status: DISCONTINUED | OUTPATIENT
Start: 2021-12-28 | End: 2021-12-30

## 2021-12-28 RX ORDER — LABETALOL HCL 100 MG
200 TABLET ORAL
Refills: 0 | Status: DISCONTINUED | OUTPATIENT
Start: 2021-12-28 | End: 2021-12-28

## 2021-12-28 RX ADMIN — OXYCODONE HYDROCHLORIDE 5 MILLIGRAM(S): 5 TABLET ORAL at 10:07

## 2021-12-28 RX ADMIN — HEPARIN SODIUM 10000 UNIT(S): 5000 INJECTION INTRAVENOUS; SUBCUTANEOUS at 06:24

## 2021-12-28 RX ADMIN — Medication 975 MILLIGRAM(S): at 17:37

## 2021-12-28 RX ADMIN — OXYCODONE HYDROCHLORIDE 5 MILLIGRAM(S): 5 TABLET ORAL at 03:10

## 2021-12-28 RX ADMIN — OXYCODONE HYDROCHLORIDE 5 MILLIGRAM(S): 5 TABLET ORAL at 17:36

## 2021-12-28 RX ADMIN — Medication 600 MILLIGRAM(S): at 12:27

## 2021-12-28 RX ADMIN — Medication 200 MILLIGRAM(S): at 17:36

## 2021-12-28 RX ADMIN — SIMETHICONE 80 MILLIGRAM(S): 80 TABLET, CHEWABLE ORAL at 06:49

## 2021-12-28 RX ADMIN — OXYCODONE HYDROCHLORIDE 5 MILLIGRAM(S): 5 TABLET ORAL at 18:30

## 2021-12-28 RX ADMIN — HEPARIN SODIUM 10000 UNIT(S): 5000 INJECTION INTRAVENOUS; SUBCUTANEOUS at 17:31

## 2021-12-28 RX ADMIN — OXYCODONE HYDROCHLORIDE 5 MILLIGRAM(S): 5 TABLET ORAL at 23:51

## 2021-12-28 RX ADMIN — Medication 975 MILLIGRAM(S): at 18:29

## 2021-12-28 RX ADMIN — OXYCODONE HYDROCHLORIDE 5 MILLIGRAM(S): 5 TABLET ORAL at 11:00

## 2021-12-28 RX ADMIN — Medication 975 MILLIGRAM(S): at 10:08

## 2021-12-28 RX ADMIN — OXYCODONE HYDROCHLORIDE 5 MILLIGRAM(S): 5 TABLET ORAL at 04:44

## 2021-12-28 RX ADMIN — Medication 975 MILLIGRAM(S): at 11:00

## 2021-12-28 RX ADMIN — Medication 975 MILLIGRAM(S): at 04:44

## 2021-12-28 RX ADMIN — Medication 90 MILLIGRAM(S): at 17:36

## 2021-12-28 RX ADMIN — Medication 975 MILLIGRAM(S): at 03:10

## 2021-12-28 RX ADMIN — Medication 600 MILLIGRAM(S): at 13:30

## 2021-12-28 NOTE — DISCHARGE NOTE OB - HOSPITAL COURSE
Hospital Course:  Patient is 26y  at 29w4d (EDC 3/) transferred from [facility] for newly diagnosed sPEC s/p Labetalol 20/40/80 IVP while at [facility], started on Procardia 30 XL for blood pressure maintenance. Pt received BMZ and magnesium for seizure ppx. On HD#4 pt underwent an urgent pLTCS for persistent severe range BPs (please see operative note for details of procedure.)     EBL: 497  Hct: 41.1->37.1   HELLP: wnl       P/C: 1.5     Na: 137->>132->128->130  K: 4.8->4.6->5.4->5.4->5.0    Patient's post operative course was unremarkable and she remained hemodynamically stable and afebrile throughout. Pt was asymptomatically hyperkalemic and hyponatremic which resolved with maintenance IV fluids. Her BPs were persistently mild range following delivery, for which she was started on Labetalol 200 BID in addition to Procardia 90 XL qDay (on POD#1).    *** Upon discharge on POD3, the patient is ambulating and voiding spontaneously, tolerated oral intake, pain was well controlled with oral medications and vital signs were stable. Hospital Course:  Patient is 26y  at 29w4d (EDC 3/6) transferred from [facility] for newly diagnosed sPEC s/p Labetalol 20/40/80 IVP while at [facility], started on Procardia 30 XL for blood pressure maintenance. Pt received BMZ and magnesium for seizure ppx. On HD#4 pt underwent an urgent pLTCS for persistent severe range BPs (please see operative note for details of procedure.)     EBL: 497  Hct: 41.1->37.1   HELLP: wnl       P/C: 1.5     Na: 137->>132->128->130  K: 4.8->4.6->5.4->5.4->5.0    Patient's post operative course was unremarkable and she remained hemodynamically stable and afebrile throughout. Pt was asymptomatically hyperkalemic and hyponatremic which resolved with maintenance IV fluids. Her BPs were persistently mild range following delivery, for which she was started on Labetalol 200 BID in addition to Procardia 90 XL qDay (on POD#1). Upon discharge on POD3, the patient is ambulating and voiding spontaneously, tolerated oral intake, pain was well controlled with oral medications and vital signs were stable. Hospital Course:  Patient is 26y  at 29w4d (EDC 3/6) transferred from [facility] for newly diagnosed sPEC s/p Labetalol 20/40/80 IVP while at [facility], started on Procardia 30 XL for blood pressure maintenance. Pt received BMZ and magnesium for seizure ppx. On HD#4 pt underwent an urgent pLTCS for persistent severe range BPs (please see operative note for details of procedure.)     EBL: 497  Hct: 41.1->37.1   HELLP: wnl       P/C: 1.5     Na: 137->>132->128->130  K: 4.8->4.6->5.4->5.4->5.0    Patient's post operative course was unremarkable and she remained hemodynamically stable and afebrile throughout. Pt was asymptomatically hyperkalemic and hyponatremic which resolved with maintenance IV fluids. Her BPs were persistently mild range following delivery, for which she was started on Labetalol 200 BID in addition to Procardia 90 XL qDay (on POD#1). Upon discharge on POD3, the patient is ambulating and voiding spontaneously, tolerated oral intake, pain was well controlled with oral medications and vital signs were stable.  Postpartum birth control plan is Nexplanon at postpartum visit, will use condoms in the interim. Hospital Course:  Patient is 26y  at 29w4d (EDC 3/6) transferred from [facility] for newly diagnosed sPEC s/p Labetalol 20/40/80 IVP while at [facility], started on Procardia 30 XL for blood pressure maintenance. Pt received BMZ and magnesium for seizure ppx. On HD#4 pt underwent an urgent pLTCS for persistent severe range BPs (please see operative note for details of procedure.)     EBL: 497  Hct: 41.1->37.1   HELLP: wnl       P/C: 1.5     Na: 137->>132->128->130  K: 4.8->4.6->5.4->5.4->5.0    Patient's post operative course was unremarkable and she remained hemodynamically stable and afebrile throughout. Pt was asymptomatically hyperkalemic and hyponatremic which resolved with maintenance IV fluids. Her BPs were persistently mild range following delivery, for which she was started on Labetalol 200 BID in addition to Procardia 90 XL qDay (on POD#1). Upon discharge on POD4, the patient is ambulating and voiding spontaneously, tolerated oral intake, pain was well controlled with oral medications and vital signs were stable.  Postpartum birth control plan is Nexplanon at postpartum visit, will use condoms in the interim.

## 2021-12-28 NOTE — PROGRESS NOTE ADULT - ASSESSMENT
Ms. Briggs is a 26y  s/p pLTCS at 30w for sPEC (persistent severe range BPs) with mild asymptomatic hyponatremia/hyperkalemia on POD#1, now resolved.     #sPEC s/p magnesium  - s/p Lab 20/20/40/80 () and Hydral 5 ()  - BPs still mild range, not requiring further pushes, no s/s sPEC  - C/w Nifedipine 90mg XL    - Ctm BP closely  - Rx for BP cuff given, pt counseled regarding BP monitoring and return precautions. All questions answered.    #POD#2  -SW for  delivery  - Continue regular diet.  - Increase ambulation.  - Continue motrin, tylenol, oxycodone PRN for pain control.  - PP BC: condoms --> 6 wk pp Nexplanon at [facility]  - Discharge planning: RTC in 2-3 days for BP check    YANCY Palma PGY1

## 2021-12-28 NOTE — DISCHARGE NOTE OB - CARE PLAN
Principal Discharge DX:	Single delivery by  section  Assessment and plan of treatment:	Instructions:  Make your follow-up appointment with your doctor as ordered.   No heavy lifting, driving, or strenuous activity for 6 weeks.   Nothing per vagina such as tampons, intercourse, douches or tub baths for 6 weeks or until you see your doctor.   Call your doctor with any signs and symptoms of infection such as fever, chills, nausea or vomiting. Call your doctor with redness or swelling at the incision site, fluid leakage or wound separation. Call your doctor if you're unable to tolerate food, increase in vaginal bleeding, or have difficulty urinating. Call your doctor if you have pain that is not relieved by your perscribed medications. Notify your doctor with any of concerns.    Preeclampsia with severe features: Given a prescription for a blood pressure cuff to monitor your blood pressure at home. Call your doctor if your blood pressure is greater than or equal to 160 systolic (top number) or 110 diastolic (bottom number), or you experience a headache unrelieved by over the counter medications, blurred vision, or difficulty breathing.    Follow up:    [facility]  Please f/u in 2 weeks for an incision check and for a postpartum appointment in 4-6 weeks @Ambulatory Clinic Unit, [facility], Oncology Building, [facility]. Please call the office for an appointment ([phone])   1

## 2021-12-28 NOTE — DISCHARGE NOTE OB - CARE PROVIDER_API CALL
[facility],   [facility]  [address] [place] [address]  Phone: [phone]  Fax: [phone]  Phone: (   )    -  Fax: (   )    -  Follow Up Time:

## 2021-12-28 NOTE — DISCHARGE NOTE OB - PLAN OF CARE
Instructions:  Make your follow-up appointment with your doctor as ordered.   No heavy lifting, driving, or strenuous activity for 6 weeks.   Nothing per vagina such as tampons, intercourse, douches or tub baths for 6 weeks or until you see your doctor.   Call your doctor with any signs and symptoms of infection such as fever, chills, nausea or vomiting. Call your doctor with redness or swelling at the incision site, fluid leakage or wound separation. Call your doctor if you're unable to tolerate food, increase in vaginal bleeding, or have difficulty urinating. Call your doctor if you have pain that is not relieved by your perscribed medications. Notify your doctor with any of concerns.    Preeclampsia with severe features: Given a prescription for a blood pressure cuff to monitor your blood pressure at home. Call your doctor if your blood pressure is greater than or equal to 160 systolic (top number) or 110 diastolic (bottom number), or you experience a headache unrelieved by over the counter medications, blurred vision, or difficulty breathing.    Follow up:    [facility]  Please f/u in 2 weeks for an incision check and for a postpartum appointment in 4-6 weeks @Ambulatory Clinic Unit, [facility], [facility], [facility]. Please call the office for an appointment ([phone])

## 2021-12-28 NOTE — DISCHARGE NOTE OB - NS MD DC FALL RISK RISK
For information on Fall & Injury Prevention, visit: [email or web address] OR  [email or web address] OR  https://www.cdc.gov/steadi/patient.html

## 2021-12-28 NOTE — DISCHARGE NOTE OB - CHANGE SANITARY PADS FREQUENTLY.  WASHING AND WIPING SHOULD OCCUR FROM FRONT TO BACK
GERD w/c on PPI, no regurgitation, CLBP 6/10 baseline uses 1 norco per day  Pt denies CP/SOB>4mets.  Denies CAD, CHF, CVA, CKD, DM2, bleeding/clotting d/o.  Denies smoking or RAD.  Denies FHX or PHx of anesthesia cxs.  Denies motion sickness.   Discussed risks/benefits GA. Questions answered.      Relevant Problems   No relevant active problems       Physical Exam    Airway   Mallampati: III  TM distance: >3 FB  Neck ROM: full       Cardiovascular - normal exam  Rhythm: regular  Rate: normal  (-) murmur     Dental - normal exam           Pulmonary - normal exam  Breath sounds clear to auscultation     Abdominal    Neurological - normal exam                 Anesthesia Plan    ASA 2       Plan - general           Plan Factors:   Patient was previously instructed to abstain from smoking on day of procedure.  Patient did not smoke on day of procedure.      Induction: intravenous    Postoperative Plan: Postoperative administration of opioids is intended.    Pertinent diagnostic labs and testing reviewed    Informed Consent:    Anesthetic plan and risks discussed with patient.    Use of blood products discussed with: patient whom consented to blood products.          Statement Selected

## 2021-12-28 NOTE — DISCHARGE NOTE OB - PATIENT PORTAL LINK FT
You can access the FollowMyHealth Patient Portal offered by [facility] by registering at the following website: [email or web address]. By joining [facility]’s FollowMyHealth portal, you will also be able to view your health information using other applications (apps) compatible with our system.

## 2021-12-28 NOTE — DISCHARGE NOTE OB - COMMUNITY RESOURCE NAME:
Patient to call and schedule an appointment for   Incision check for 2 weeks after delivery date at [facility]  [phone].

## 2021-12-28 NOTE — DISCHARGE NOTE OB - MEDICATION SUMMARY - MEDICATIONS TO TAKE
I will START or STAY ON the medications listed below when I get home from the hospital:    blood pressure cuff  -- 3 times a day   -- Indication: For Preeclampsia with severe features    labetalol 200 mg oral tablet  -- 1 tab(s) by mouth 2 times a day   -- It is very important that you take or use this exactly as directed.  Do not skip doses or discontinue unless directed by your doctor.  May cause drowsiness or dizziness.  Some non-prescription drugs may aggravate your condition.  Read all labels carefully.  If a warning appears, check with your doctor before taking.    -- Indication: For Preeclampsia with severe features    NIFEdipine 90 mg oral tablet, extended release  -- 1 tab(s) by mouth once a day   -- Avoid grapefruit and grapefruit juice while taking this medication.  It is very important that you take or use this exactly as directed.  Do not skip doses or discontinue unless directed by your doctor.  Some non-prescription drugs may aggravate your condition.  Read all labels carefully.  If a warning appears, check with your doctor before taking.  Swallow whole.  Do not crush.    -- Indication: For Preeclampsia with severe features

## 2021-12-28 NOTE — PROGRESS NOTE ADULT - ATTENDING COMMENTS
Patient seen and examined.  PEC with severe features although currently stable without lab abnormalities and BPs well controlled on current regimen.  No neurologic symptoms.  Stable for conservative management at this time.  Plan to continue current conservative management and deliver at 34 weeks or earlier if a clinical indication presents.
Patient seen and examined.  PEC with severe features although currently stable without lab abnormalities and BPs well controlled on current regimen.  No neurologic symptoms.  Stable for conservative management at this time.  Plan to continue current conservative management and deliver at 34 weeks or earlier if a clinical indication presents.
saw and examined patient  BP still elevated. Will add labetalol
OBGYN Service Attending    Pt seen at bedside.  Agree with above.  Doing well POD#1.  Pain controlled.  Feels slightly dizzy on Magnesium.  BP controlled.  No signs/symptoms of worsening pre-eclampsia. Hyponatremia and hyperkalemia noted on labs from this morning.  Will change IV fluids to NS, repeat labs now.  Routine PP care.      SEGUN Reilly MD
Patient seen and examined and agree with above.  PEC with severe features although currently stable without lab abnormalities and BPs well controlled on current regimen.  No neurologic symptoms.  Stable for conservative management at this time.  Discussed risks/benefits/alternatives. The patient understands that there is some maternal risk with conservative management which is undertaken in order to optimize fetal status (GA).  She verbalized understanding of all of the above.  Plan to continue current conservative management and deliver at 34 weeks or earlier if a clinical indication presents.

## 2021-12-28 NOTE — DISCHARGE NOTE OB - PROVIDER TOKENS
FREE:[LAST:[[facility]],PHONE:[(   )    -],FAX:[(   )    -],ADDRESS:[[facility]  [phone] [place] [address]  Phone: [phone]  Fax: [phone]]]

## 2021-12-29 RX ORDER — LABETALOL HCL 100 MG
1 TABLET ORAL
Qty: 60 | Refills: 0
Start: 2021-12-29 | End: 2022-01-27

## 2021-12-29 RX ORDER — NIFEDIPINE 30 MG
1 TABLET, EXTENDED RELEASE 24 HR ORAL
Qty: 30 | Refills: 0
Start: 2021-12-29 | End: 2022-01-27

## 2021-12-29 RX ADMIN — SIMETHICONE 80 MILLIGRAM(S): 80 TABLET, CHEWABLE ORAL at 12:22

## 2021-12-29 RX ADMIN — Medication 200 MILLIGRAM(S): at 17:37

## 2021-12-29 RX ADMIN — Medication 975 MILLIGRAM(S): at 18:39

## 2021-12-29 RX ADMIN — HEPARIN SODIUM 10000 UNIT(S): 5000 INJECTION INTRAVENOUS; SUBCUTANEOUS at 05:14

## 2021-12-29 RX ADMIN — Medication 600 MILLIGRAM(S): at 06:00

## 2021-12-29 RX ADMIN — SIMETHICONE 80 MILLIGRAM(S): 80 TABLET, CHEWABLE ORAL at 17:36

## 2021-12-29 RX ADMIN — Medication 975 MILLIGRAM(S): at 13:00

## 2021-12-29 RX ADMIN — Medication 1 MILLIGRAM(S): at 12:23

## 2021-12-29 RX ADMIN — Medication 200 MILLIGRAM(S): at 06:49

## 2021-12-29 RX ADMIN — Medication 975 MILLIGRAM(S): at 06:00

## 2021-12-29 RX ADMIN — Medication 600 MILLIGRAM(S): at 18:39

## 2021-12-29 RX ADMIN — Medication 975 MILLIGRAM(S): at 05:13

## 2021-12-29 RX ADMIN — Medication 975 MILLIGRAM(S): at 19:40

## 2021-12-29 RX ADMIN — Medication 600 MILLIGRAM(S): at 12:22

## 2021-12-29 RX ADMIN — Medication 600 MILLIGRAM(S): at 05:13

## 2021-12-29 RX ADMIN — Medication 600 MILLIGRAM(S): at 13:00

## 2021-12-29 RX ADMIN — Medication 90 MILLIGRAM(S): at 17:36

## 2021-12-29 RX ADMIN — HEPARIN SODIUM 10000 UNIT(S): 5000 INJECTION INTRAVENOUS; SUBCUTANEOUS at 17:38

## 2021-12-29 RX ADMIN — Medication 600 MILLIGRAM(S): at 17:37

## 2021-12-29 RX ADMIN — Medication 975 MILLIGRAM(S): at 12:24

## 2021-12-29 NOTE — PROGRESS NOTE ADULT - ASSESSMENT
Ms. Briggs is a 26y  POD#3 s/p pLTCS at 30w for sPEC (persistent severe range BPs) with mild asymptomatic hyponatremia/hyperkalemia on POD#1, now resolved. BPs mild range POD#2 and Labetalol 200 BID added with improvement.    #sPEC s/p magnesium  - s/p Lab 20/20/40/80 () and Hydral 5 ()  - BPs still mild range, not requiring further pushes, no s/s sPEC  - C/w Nifedipine 90mg XL, Labetalol 200 BID   - Ctm BP closely  - Rx for BP cuff given, pt counseled regarding BP monitoring and return precautions. All questions answered.    #POD#3  -SW for  delivery  - Continue regular diet.  - Increase ambulation.  - Continue motrin, tylenol, oxycodone PRN for pain control.  - PP BC: condoms --> 6 wk pp Nexplanon at [facility]  - Discharge planning: RTC in 2-3 days for BP check    YANCY Palma PGY1

## 2021-12-30 VITALS
HEART RATE: 89 BPM | TEMPERATURE: 98 F | OXYGEN SATURATION: 97 % | DIASTOLIC BLOOD PRESSURE: 82 MMHG | RESPIRATION RATE: 18 BRPM | SYSTOLIC BLOOD PRESSURE: 131 MMHG

## 2021-12-30 RX ADMIN — HEPARIN SODIUM 10000 UNIT(S): 5000 INJECTION INTRAVENOUS; SUBCUTANEOUS at 05:40

## 2021-12-30 RX ADMIN — OXYCODONE HYDROCHLORIDE 5 MILLIGRAM(S): 5 TABLET ORAL at 01:50

## 2021-12-30 RX ADMIN — Medication 600 MILLIGRAM(S): at 05:40

## 2021-12-30 RX ADMIN — Medication 200 MILLIGRAM(S): at 05:40

## 2021-12-30 RX ADMIN — OXYCODONE HYDROCHLORIDE 5 MILLIGRAM(S): 5 TABLET ORAL at 00:48

## 2021-12-30 RX ADMIN — OXYCODONE HYDROCHLORIDE 5 MILLIGRAM(S): 5 TABLET ORAL at 05:40

## 2021-12-30 RX ADMIN — Medication 975 MILLIGRAM(S): at 01:50

## 2021-12-30 RX ADMIN — OXYCODONE HYDROCHLORIDE 5 MILLIGRAM(S): 5 TABLET ORAL at 06:52

## 2021-12-30 RX ADMIN — Medication 1 MILLIGRAM(S): at 12:27

## 2021-12-30 RX ADMIN — Medication 90 MILLIGRAM(S): at 05:40

## 2021-12-30 RX ADMIN — Medication 600 MILLIGRAM(S): at 06:52

## 2021-12-30 RX ADMIN — Medication 975 MILLIGRAM(S): at 00:50

## 2021-12-30 NOTE — PROGRESS NOTE ADULT - SUBJECTIVE AND OBJECTIVE BOX
MFM Note    Ms. Youssef was seen and examined at bedside. She is feeling overall well and without complaints. She denies any contractions, vaginal bleeding, loss of fluid or decreased fetal movement. No fevers, chills, chest pain, shortness of breath, dizziness, lightheadedness, nausea, vomiting or diarrhea.      labor without delivery, antepartum    No pertinent past medical history      T(C): 36.9 (21 @ 09:20), Max: 37.0 (21 @ 06:21)  HR: 105 (21 @ 09:47) (72 - 114)  BP: 152/76 (21 @ 09:44) (117/56 - 164/78)  RR: 18 (21 @ 17:57) (18 - 19)  SpO2: 98% (21 @ 09:47) (87% - 100%)    21 @ 07:01  -  21 @ 07:00  --------------------------------------------------------  IN: 1200 mL / OUT: 1125 mL / NET: 75 mL    21 @ 07:01  -  21 @ 10:02  --------------------------------------------------------  IN: 300 mL / OUT: 400 mL / NET: -100 mL      Gen: NAD  Resp: Unlabored  Abd: Soft, gravid, nontender    Sono:  Yunior breech presentation, posterior placenta, MVP 4.5 cm                          12.7   8.88  )-----------( 276      ( 23 Dec 2021 18:52 )             39.3       134<L>  |  103  |  9   ----------------------------<  123<H>  4.1   |  19<L>  |  0.46<L>    Ca    8.6      23 Dec 2021 18:52  Mg     5.50     12-24    TPro  6.2  /  Alb  3.4  /  TBili  <0.2  /  DBili  x   /  AST  20  /  ALT  29  /  AlkPhos  85  12-      acetaminophen     Tablet .. 975 milliGRAM(s) Oral once  folic acid 1 milliGRAM(s) Oral daily  lactated ringers. 1000 milliLiter(s) IV Continuous <Continuous>  magnesium sulfate Infusion 2 Gm/Hr IV Continuous <Continuous>  metoclopramide Injectable 10 milliGRAM(s) IV Push once  NIFEdipine XL 30 milliGRAM(s) Oral daily  prenatal multivitamin 1 Tablet(s) Oral daily  
OB Postpartum Note:  Delivery, POD#3    S: 27yo POD#3 s/p LTCS. The patient feels well.  Pain is well controlled. She is tolerating a regular diet and passing flatus. She is voiding spontaneously, and ambulating without difficulty. Denies CP/SOB, lightheadedness/dizziness, N/V. Denies HA, changes in vision, RUQ pain, palpitations, increased LE edema.    O:  Vitals:  Vital Signs Last 24 Hrs  T(C): 37.3 (29 Dec 2021 06:21), Max: 37.6 (28 Dec 2021 14:10)  T(F): 99.1 (29 Dec 2021 06:21), Max: 99.7 (28 Dec 2021 14:10)  HR: 106 (29 Dec 2021 06:21) (90 - 106)  BP: 144/80 (29 Dec 2021 06:21) (115/62 - 151/74)  BP(mean): --  RR: 18 (29 Dec 2021 06:21) (17 - 18)  SpO2: 98% (29 Dec 2021 06:21) (98% - 100%)    MEDICATIONS  (STANDING):  acetaminophen     Tablet .. 975 milliGRAM(s) Oral <User Schedule>  diphtheria/tetanus/pertussis (acellular) Vaccine (ADAcel) 0.5 milliLiter(s) IntraMuscular once  folic acid 1 milliGRAM(s) Oral daily  heparin   Injectable 05053 Unit(s) SubCutaneous every 12 hours  ibuprofen  Tablet. 600 milliGRAM(s) Oral every 6 hours  labetalol 200 milliGRAM(s) Oral two times a day  NIFEdipine XL 90 milliGRAM(s) Oral daily  oxytocin Infusion 333.333 milliUNIT(s)/Min (1000 mL/Hr) IV Continuous <Continuous>  prenatal multivitamin 1 Tablet(s) Oral daily  sodium chloride 0.9%. 1000 milliLiter(s) (50 mL/Hr) IV Continuous <Continuous>    MEDICATIONS  (PRN):  diphenhydrAMINE 25 milliGRAM(s) Oral every 6 hours PRN Pruritus  lanolin Ointment 1 Application(s) Topical every 6 hours PRN Sore Nipples  magnesium hydroxide Suspension 30 milliLiter(s) Oral two times a day PRN Constipation  oxyCODONE    IR 5 milliGRAM(s) Oral every 3 hours PRN Moderate to Severe Pain (4-10)  oxyCODONE    IR 5 milliGRAM(s) Oral once PRN Moderate to Severe Pain (4-10)  simethicone 80 milliGRAM(s) Chew every 4 hours PRN Gas      LABS:  Blood type: B Negative  Rubella IgG: RPR: Negative                          12.1   17.93<H> >-----------< 341    (  @ 08:05 )             37.1                        12.6   22.14<H> >-----------< 352    (  @ 01:35 )             39.5                        13.0   10.47 >-----------< 339    (  @ 16:31 )             41.1    21 @ 13:20      133<L>  |  98  |  17  ----------------------------<  101<H>  5.0   |  23  |  0.64    21 @ 08:05      128<L>  |  97<L>  |  19  ----------------------------<  94  5.4<H>   |  20<L>  |  0.59    21 @ 01:35      132<L>  |  100  |  18  ----------------------------<  86  5.4<H>   |  21<L>  |  0.63    21 @ 16:31      136  |  103  |  18  ----------------------------<  85  4.6   |  21<L>  |  0.58        Ca    7.8<L>      27 Dec 2021 13:20  Ca    7.4<L>      27 Dec 2021 08:05  Ca    8.2<L>      27 Dec 2021 01:35  Ca    9.1      26 Dec 2021 16:31  Mg     5.20<H>     12-27  Mg     5.80<H>     12-  Mg     5.70<H>     12-27  Mg     4.90<H>         TPro  5.4<L>  /  Alb  3.0<L>  /  TBili  0.2  /  DBili  x   /  AST  18  /  ALT  20  /  AlkPhos  72  21 @ 08:05  TPro  5.8<L>  /  Alb  3.2<L>  /  TBili  <0.2  /  DBili  x   /  AST  19  /  ALT  17  /  AlkPhos  78  21 @ 01:35  TPro  6.0  /  Alb  3.4  /  TBili  <0.2  /  DBili  x   /  AST  17  /  ALT  20  /  AlkPhos  87  21 @ 16:31          Physical exam:  Gen: NAD  Abdomen: Soft, nontender, no distension , firm uterine fundus at umbilicus.  Incision: Clean, dry, and intact   Ext: No calf tenderness  
R3 Antepartum Note,       Patient seen and examined at bedside, no acute overnight events. No acute complaints. Pt reports +FM, denies LOF, VB, ctx, HA, epigastric pain, blurred vision, CP, SOB, N/V, fevers, and chills.    Vital Signs Last 24 Hours  T(C): 37 (12-26-21 @ 01:37), Max: 37.2 (12-25-21 @ 09:17)  HR: 86 (12-26-21 @ 01:37) (86 - 104)  BP: 135/65 (12-26-21 @ 01:37) (122/60 - 161/77)  RR: 17 (12-26-21 @ 01:37) (17 - 18)  SpO2: 98% (12-26-21 @ 01:37) (93% - 100%)    CAPILLARY BLOOD GLUCOSE          Physical Exam:  General: NAD  Abdomen: Soft, non-tender, gravid  Ext: No pain or swelling    Labs:    Mg     5.60     12-24 @ 15:17      PT/INR - ( 12-23 @ 18:52 )   PT: 10.4 sec;   INR: 0.90 ratio    PTT - ( 12-23 @ 18:52 )  PTT:26.5 sec    Uric Acid: (12-23 @ 18:52)  4.7      Fibrinogen: (12-23 @ 18:52)  598      LDH: (12-23 @ 18:52)  185        MEDICATIONS  (STANDING):  acetaminophen     Tablet .. 975 milliGRAM(s) Oral once  folic acid 1 milliGRAM(s) Oral daily  heparin   Injectable 5000 Unit(s) SubCutaneous every 8 hours  lactated ringers. 1000 milliLiter(s) (50 mL/Hr) IV Continuous <Continuous>  metoclopramide Injectable 10 milliGRAM(s) IV Push once  NIFEdipine XL 60 milliGRAM(s) Oral daily  prenatal multivitamin 1 Tablet(s) Oral daily    MEDICATIONS  (PRN):  
OB Postpartum Note:  Delivery, POD#4    S: 27yo POD#4 s/p LTCS. The patient feels well.  Pain is well controlled. She is tolerating a regular diet and passing flatus. She is voiding spontaneously, and ambulating without difficulty. Denies CP/SOB, lightheadedness/dizziness, N/V. Denies HA, changes in vision, RUQ pain, palpitations, increased LE edema.    O:  Vitals:  Vital Signs Last 24 Hrs  T(C): 37.2 (30 Dec 2021 05:36), Max: 37.2 (29 Dec 2021 14:30)  T(F): 98.9 (30 Dec 2021 05:36), Max: 98.9 (29 Dec 2021 14:30)  HR: 89 (30 Dec 2021 05:39) (89 - 108)  BP: 148/86 (30 Dec 2021 05:39) (126/66 - 152/90)  BP(mean): --  RR: 18 (30 Dec 2021 05:36) (17 - 18)  SpO2: 100% (30 Dec 2021 05:36) (98% - 100%)    MEDICATIONS  (STANDING):  acetaminophen     Tablet .. 975 milliGRAM(s) Oral <User Schedule>  diphtheria/tetanus/pertussis (acellular) Vaccine (ADAcel) 0.5 milliLiter(s) IntraMuscular once  folic acid 1 milliGRAM(s) Oral daily  heparin   Injectable 60475 Unit(s) SubCutaneous every 12 hours  ibuprofen  Tablet. 600 milliGRAM(s) Oral every 6 hours  labetalol 200 milliGRAM(s) Oral two times a day  NIFEdipine XL 90 milliGRAM(s) Oral daily  oxytocin Infusion 333.333 milliUNIT(s)/Min (1000 mL/Hr) IV Continuous <Continuous>  prenatal multivitamin 1 Tablet(s) Oral daily  sodium chloride 0.9%. 1000 milliLiter(s) (50 mL/Hr) IV Continuous <Continuous>    MEDICATIONS  (PRN):  diphenhydrAMINE 25 milliGRAM(s) Oral every 6 hours PRN Pruritus  lanolin Ointment 1 Application(s) Topical every 6 hours PRN Sore Nipples  magnesium hydroxide Suspension 30 milliLiter(s) Oral two times a day PRN Constipation  oxyCODONE    IR 5 milliGRAM(s) Oral every 3 hours PRN Moderate to Severe Pain (4-10)  oxyCODONE    IR 5 milliGRAM(s) Oral once PRN Moderate to Severe Pain (4-10)  simethicone 80 milliGRAM(s) Chew every 4 hours PRN Gas      LABS:  Blood type: B Negative  Rubella IgG: RPR: Negative      21 @ 13:20      133<L>  |  98  |  17  ----------------------------<  101<H>  5.0   |  23  |  0.64        Ca    7.8<L>      27 Dec 2021 13:20  Mg     5.20<H>       Mg     5.80<H>                 Physical exam:  Gen: NAD  Abdomen: Soft, nontender, no distension , firm uterine fundus at umbilicus.  Incision: Clean, dry, and intact   Ext: No calf tenderness  
Pain Service Follow-up  Postop Day  1    S/P  C- Section    T(C): 36.9 (12-27-21 @ 09:05), Max: 37.3 (12-26-21 @ 14:02)  HR: 88 (12-27-21 @ 09:05) (67 - 95)  BP: 136/80 (12-27-21 @ 09:05) (120/66 - 190/93)  RR: 18 (12-27-21 @ 09:05) (0 - 24)  SpO2: 97% (12-27-21 @ 09:05) (93% - 100%)  Wt(kg): --      THERAPY:  Spinal Morphine     Sedation Score:	  [X] Alert	      [  ] Drowsy       [  ] Arousable	[  ] Asleep         [  ] Unresponsive    Side Effects:	  [X] None	      [  ] Nausea       [  ] Pruritus        [  ] Weakness   [  ] Numbness        ASSESSMENT/ PLAN   [ X ] Discontinue         [  ] Continue    [ X ] Documentation and Verification of current medications       Satisfactory Post Anesthetic Course    
Patient seen and examined at bedside, no acute overnight events. No acute complaints. Patient endorses good fetal movement. Patient is ambulating and tolerating regular diet. Denies CP, SOB, N/V, fevers, chills, or any other concerns.    Vital Signs Last 24 Hours  T(C): 36.7 (12-25-21 @ 01:49), Max: 37.4 (12-24-21 @ 21:34)  HR: 101 (12-25-21 @ 01:49) (82 - 114)  BP: 136/80 (12-25-21 @ 01:49) (112/65 - 172/84)  RR: 17 (12-25-21 @ 01:49) (16 - 19)  SpO2: 100% (12-25-21 @ 01:49) (90% - 100%)    I&O's Summary    23 Dec 2021 07:01  -  24 Dec 2021 07:00  --------------------------------------------------------  IN: 1200 mL / OUT: 1125 mL / NET: 75 mL    24 Dec 2021 07:01  -  25 Dec 2021 04:59  --------------------------------------------------------  IN: 1000 mL / OUT: 1200 mL / NET: -200 mL        Physical Exam:  General: NAD  CV: RR  Lungs: breathing comfortably on RA  Abdomen: soft, gravid, non-tender  Ext: no pain or swelling    Labs:             12.7   8.88  )-----------( 276      ( 12-23 @ 18:52 )             39.3                12.9   8.69  )-----------( 287      ( 12-23 @ 12:22 )             38.9         MEDICATIONS  (STANDING):  acetaminophen     Tablet .. 975 milliGRAM(s) Oral once  folic acid 1 milliGRAM(s) Oral daily  heparin   Injectable 5000 Unit(s) SubCutaneous every 8 hours  lactated ringers. 1000 milliLiter(s) (50 mL/Hr) IV Continuous <Continuous>  metoclopramide Injectable 10 milliGRAM(s) IV Push once  NIFEdipine XL 60 milliGRAM(s) Oral daily  prenatal multivitamin 1 Tablet(s) Oral daily    MEDICATIONS  (PRN):  
MFM Note    Ms. Briggs was seen and examined at bedside. She is feeling overall well and complains only of bilateral nipple leakage. She denies any contractions, vaginal bleeding, loss of fluid or decreased fetal movement. No fevers, chills, chest pain, shortness of breath, dizziness, lightheadedness, nausea, vomiting or diarrhea. No headache, vision changes or right upper quadrant pain.     labor without delivery, antepartum    No pertinent past medical history      T(C): 37.2 (21 @ 05:31), Max: 37.2 (21 @ 13:25)  HR: 88 (21 @ 05:31) (86 - 94)  BP: 151/86 (21 @ 05:31) (122/60 - 151/86)  RR: 17 (21 @ 05:31) (17 - 18)  SpO2: 100% (21 @ 05:31) (98% - 100%)    21 @ 07:01  -  21 @ 07:00  --------------------------------------------------------  IN: 0 mL / OUT: 2620 mL / NET: -2620 mL      Gen: NAD  Resp: Unlabored                            12.1   8.90  )-----------( 303      ( 26 Dec 2021 06:16 )             38.7       137  |  106  |  20  ----------------------------<  77  4.8   |  22  |  0.63    Ca    8.3<L>      26 Dec 2021 06:16  Mg     5.60         TPro  5.6<L>  /  Alb  3.2<L>  /  TBili  <0.2  /  DBili  x   /  AST  14  /  ALT  19  /  AlkPhos  82        acetaminophen     Tablet .. 975 milliGRAM(s) Oral once  folic acid 1 milliGRAM(s) Oral daily  heparin   Injectable 5000 Unit(s) SubCutaneous every 8 hours  metoclopramide Injectable 10 milliGRAM(s) IV Push once  NIFEdipine XL 60 milliGRAM(s) Oral daily  prenatal multivitamin 1 Tablet(s) Oral daily  
OB Progress Note: LTCS, POD#2    S: 25yo POD#2 s/p pLTCS at 30w for sPEC (persistent severe range BPs) with mild asymptomatic hyponatremia/hyperkalemia on POD#1, now resolved. Pain is well controlled. She is tolerating a regular diet and passing flatus. She is voiding spontaneously and ambulating without difficulty. Denies CP/SOB, lightheadedness/dizziness, N/V. Denies HA, changes in vision, RUQ pain, palpitations, increased LE edema.    O:  Vitals:  Vital Signs Last 24 Hrs  T(C): 37 (28 Dec 2021 06:35), Max: 37 (27 Dec 2021 20:30)  T(F): 98.6 (28 Dec 2021 06:35), Max: 98.6 (27 Dec 2021 20:30)  HR: 109 (28 Dec 2021 06:35) (80 - 109)  BP: 144/67 (28 Dec 2021 06:35) (132/73 - 149/81)  BP(mean): --  RR: 20 (28 Dec 2021 06:35) (18 - 22)  SpO2: 96% (28 Dec 2021 06:35) (96% - 98%)    MEDICATIONS  (STANDING):  acetaminophen     Tablet .. 975 milliGRAM(s) Oral <User Schedule>  diphtheria/tetanus/pertussis (acellular) Vaccine (ADAcel) 0.5 milliLiter(s) IntraMuscular once  folic acid 1 milliGRAM(s) Oral daily  heparin   Injectable 76885 Unit(s) SubCutaneous every 12 hours  ibuprofen  Tablet. 600 milliGRAM(s) Oral every 6 hours  NIFEdipine XL 90 milliGRAM(s) Oral daily  oxytocin Infusion 333.333 milliUNIT(s)/Min (1000 mL/Hr) IV Continuous <Continuous>  prenatal multivitamin 1 Tablet(s) Oral daily  sodium chloride 0.9%. 1000 milliLiter(s) (50 mL/Hr) IV Continuous <Continuous>      MEDICATIONS  (PRN):  diphenhydrAMINE 25 milliGRAM(s) Oral every 6 hours PRN Pruritus  lanolin Ointment 1 Application(s) Topical every 6 hours PRN Sore Nipples  magnesium hydroxide Suspension 30 milliLiter(s) Oral two times a day PRN Constipation  oxyCODONE    IR 5 milliGRAM(s) Oral once PRN Moderate to Severe Pain (4-10)  oxyCODONE    IR 5 milliGRAM(s) Oral every 3 hours PRN Moderate to Severe Pain (4-10)  simethicone 80 milliGRAM(s) Chew every 4 hours PRN Gas      Labs:  Blood type: B Negative  Rubella IgG: RPR: Negative                          12.1   17.93<H> >-----------< 341    ( 12-27 @ 08:05 )             37.1                        12.6   22.14<H> >-----------< 352    ( 12-27 @ 01:35 )             39.5                        13.0   10.47 >-----------< 339    ( 12-26 @ 16:31 )             41.1                        12.1   8.90 >-----------< 303    ( 12-26 @ 06:16 )             38.7    12-27-21 @ 13:20      133<L>  |  98  |  17  ----------------------------<  101<H>  5.0   |  23  |  0.64    12-27-21 @ 08:05      128<L>  |  97<L>  |  19  ----------------------------<  94  5.4<H>   |  20<L>  |  0.59    12-27-21 @ 01:35      132<L>  |  100  |  18  ----------------------------<  86  5.4<H>   |  21<L>  |  0.63    12-26-21 @ 16:31      136  |  103  |  18  ----------------------------<  85  4.6   |  21<L>  |  0.58    12-26-21 @ 06:16      137  |  106  |  20  ----------------------------<  77  4.8   |  22  |  0.63      Ca    7.8<L>      27 Dec 2021 13:20  Ca    7.4<L>      27 Dec 2021 08:05  Ca    8.2<L>      27 Dec 2021 01:35  Ca    9.1      26 Dec 2021 16:31  Ca    8.3<L>      26 Dec 2021 06:16  Mg     5.20<H>     12-27  Mg     5.80<H>     12-27  Mg     5.70<H>     12-27  Mg     4.90<H>     12-27    TPro  5.4<L>  /  Alb  3.0<L>  /  TBili  0.2  /  DBili  x   /  AST  18  /  ALT  20  /  AlkPhos  72  12-27-21 @ 08:05  TPro  5.8<L>  /  Alb  3.2<L>  /  TBili  <0.2  /  DBili  x   /  AST  19  /  ALT  17  /  AlkPhos  78  12-27-21 @ 01:35  TPro  6.0  /  Alb  3.4  /  TBili  <0.2  /  DBili  x   /  AST  17  /  ALT  20  /  AlkPhos  87  12-26-21 @ 16:31  TPro  5.6<L>  /  Alb  3.2<L>  /  TBili  <0.2  /  DBili  x   /  AST  14  /  ALT  19  /  AlkPhos  82  12-26-21 @ 06:16    PE:  General: NAD  Abdomen: Soft, appropriately tender, incision c/d/i.  Extremities: No erythema, no pitting edema  GYN: minimal lochia on pad    A/P: 26yyo POD#2 s/p LTCS.  Patient is stable and doing well post-operatively.    - Continue regular diet.  - Increase ambulation.  - Continue motrin, tylenol, oxycodone PRN for pain control. vs. D/c PCEA today and transition to PO pain medication with motrin, tylenol and oxycodone PRN.     Plan discussed with attending Dr. Zoie Palma, PGY-1
Patient seen and examined at bedside, no acute overnight events. No acute complaints. Patient endorses good fetal movement. Patient is ambulating and tolerating regular diet. Denies CP, SOB, N/V, fevers, chills, or any other concerns.    Vital Signs Last 24 Hours  T(C): 36.8 (12-24-21 @ 02:33), Max: 36.8 (12-24-21 @ 02:33)  HR: 85 (12-24-21 @ 03:47) (72 - 110)  BP: 148/70 (12-24-21 @ 03:43) (117/57 - 164/78)  RR: 18 (12-23-21 @ 17:57) (18 - 19)  SpO2: 97% (12-24-21 @ 03:47) (87% - 98%)    I&O's Summary    23 Dec 2021 07:01  -  24 Dec 2021 03:50  --------------------------------------------------------  IN: 800 mL / OUT: 275 mL / NET: 525 mL        Physical Exam:  General: NAD  CV: RR  Lungs: breathing comfortably on RA  Abdomen: soft, gravid, non-tender  Ext: no pain or swelling    Labs:             12.7   8.88  )-----------( 276      ( 12-23 @ 18:52 )             39.3                12.9   8.69  )-----------( 287      ( 12-23 @ 12:22 )             38.9         MEDICATIONS  (STANDING):  acetaminophen     Tablet .. 975 milliGRAM(s) Oral once  folic acid 1 milliGRAM(s) Oral daily  lactated ringers. 1000 milliLiter(s) (50 mL/Hr) IV Continuous <Continuous>  magnesium sulfate Infusion 2 Gm/Hr (50 mL/Hr) IV Continuous <Continuous>  metoclopramide Injectable 10 milliGRAM(s) IV Push once  NIFEdipine XL 30 milliGRAM(s) Oral daily  prenatal multivitamin 1 Tablet(s) Oral daily    MEDICATIONS  (PRN):  
Postpartum Note  POD#1 status post pLTCS    Patient seen and examined at bedside, no acute overnight events. No acute complaints, pain well controlled. Patient is not yet ambulating, hoffman in place, regular diet. Baby is in NICU.    Vital Signs Last 24 Hours  T(C): 36.5 (12-26-21 @ 22:48), Max: 37.3 (12-26-21 @ 14:02)  HR: 83 (12-27-21 @ 06:30) (67 - 95)  BP: 126/72 (12-27-21 @ 06:30) (120/67 - 190/93)  RR: 24 (12-27-21 @ 06:30) (0 - 24)  SpO2: 94% (12-27-21 @ 06:30) (93% - 100%)    Physical Exam:  General: NAD  Abdomen: obese, soft, appropriately tender, non-distended, fundus firm  Incision: pressure dressing in place, C/D/I  Pelvic: Lochia wnl    Labs:  Blood Type: B Negative  Antibody Screen: Positive  RPR: Negative               12.6   22.14 )-----------( 352      ( 12-27 @ 01:35 )             39.5                13.0   10.47 )-----------( 339      ( 12-26 @ 16:31 )             41.1                12.1   8.90  )-----------( 303      ( 12-26 @ 06:16 )             38.7         MEDICATIONS  (STANDING):  acetaminophen     Tablet .. 975 milliGRAM(s) Oral <User Schedule>  diphtheria/tetanus/pertussis (acellular) Vaccine (ADAcel) 0.5 milliLiter(s) IntraMuscular once  folic acid 1 milliGRAM(s) Oral daily  heparin   Injectable 78689 Unit(s) SubCutaneous every 12 hours  ibuprofen  Tablet. 600 milliGRAM(s) Oral every 6 hours  ketorolac   Injectable 30 milliGRAM(s) IV Push every 6 hours  lactated ringers. 1000 milliLiter(s) (125 mL/Hr) IV Continuous <Continuous>  magnesium sulfate Infusion 2 Gm/Hr (50 mL/Hr) IV Continuous <Continuous>  magnesium sulfate Infusion 2 Gm/Hr (50 mL/Hr) IV Continuous <Continuous>  oxytocin Infusion 333.333 milliUNIT(s)/Min (1000 mL/Hr) IV Continuous <Continuous>  prenatal multivitamin 1 Tablet(s) Oral daily    MEDICATIONS  (PRN):  diphenhydrAMINE 25 milliGRAM(s) Oral every 6 hours PRN Pruritus  lanolin Ointment 1 Application(s) Topical every 6 hours PRN Sore Nipples  magnesium hydroxide Suspension 30 milliLiter(s) Oral two times a day PRN Constipation  oxyCODONE    IR 5 milliGRAM(s) Oral every 3 hours PRN Moderate to Severe Pain (4-10)  oxyCODONE    IR 5 milliGRAM(s) Oral once PRN Moderate to Severe Pain (4-10)  simethicone 80 milliGRAM(s) Chew every 4 hours PRN Gas

## 2021-12-30 NOTE — PROGRESS NOTE ADULT - ASSESSMENT
Ms. Briggs is a 26y  POD#4 s/p pLTCS at 30w for sPEC (persistent severe range BPs) meeting all postoperative milestones.    #sPEC s/p magnesium, Lab 20/20/40/80 () and Hydral 5 ()  - BPs mild range, no s/s sPEC  - C/w Nifedipine 90mg XL, Labetalol 200 BID  - Ctm BP closely  - Rx for BP cuff given, pt counseled regarding BP monitoring and return precautions. All questions answered.    #POD#4  -SW for  delivery  - Continue regular diet.  - Increase ambulation.  - Continue motrin, tylenol, oxycodone PRN for pain control.  - PP BC: condoms --> 6 wk pp Nexplanon at [facility]  - Discharge planning: RTC in 2-3 days for BP check, 2 weeks for incision check    YANCY Palma PGY1

## 2022-01-03 ENCOUNTER — APPOINTMENT (OUTPATIENT)
Dept: OBGYN | Facility: CLINIC | Age: 27
End: 2022-01-03

## 2022-01-03 ENCOUNTER — OUTPATIENT (OUTPATIENT)
Dept: OUTPATIENT SERVICES | Facility: HOSPITAL | Age: 27
LOS: 1 days | End: 2022-01-03
Payer: MEDICAID

## 2022-01-03 ENCOUNTER — APPOINTMENT (OUTPATIENT)
Dept: ANTEPARTUM | Facility: CLINIC | Age: 27
End: 2022-01-03

## 2022-01-03 ENCOUNTER — APPOINTMENT (OUTPATIENT)
Dept: OBGYN | Facility: CLINIC | Age: 27
End: 2022-01-03
Payer: MEDICAID

## 2022-01-03 VITALS
TEMPERATURE: 97.1 F | HEART RATE: 103 BPM | BODY MASS INDEX: 47.48 KG/M2 | DIASTOLIC BLOOD PRESSURE: 86 MMHG | SYSTOLIC BLOOD PRESSURE: 129 MMHG | OXYGEN SATURATION: 99 % | WEIGHT: 268 LBS | RESPIRATION RATE: 18 BRPM | HEIGHT: 63 IN

## 2022-01-03 DIAGNOSIS — Z87.448 PERSONAL HISTORY OF OTHER DISEASES OF URINARY SYSTEM: ICD-10-CM

## 2022-01-03 DIAGNOSIS — Z34.00 ENCOUNTER FOR SUPERVISION OF NORMAL FIRST PREGNANCY, UNSPECIFIED TRIMESTER: ICD-10-CM

## 2022-01-03 DIAGNOSIS — E66.01 MORBID (SEVERE) OBESITY DUE TO EXCESS CALORIES: ICD-10-CM

## 2022-01-03 DIAGNOSIS — R82.71 BACTERIURIA: ICD-10-CM

## 2022-01-03 DIAGNOSIS — O12.13 GESTATIONAL PROTEINURIA, THIRD TRIMESTER: ICD-10-CM

## 2022-01-03 DIAGNOSIS — Z67.91 OTHER SPECIFIED PREGNANCY RELATED CONDITIONS, UNSPECIFIED TRIMESTER: ICD-10-CM

## 2022-01-03 DIAGNOSIS — Z87.440 PERSONAL HISTORY OF URINARY (TRACT) INFECTIONS: ICD-10-CM

## 2022-01-03 DIAGNOSIS — O26.03 EXCESSIVE WEIGHT GAIN IN PREGNANCY, THIRD TRIMESTER: ICD-10-CM

## 2022-01-03 DIAGNOSIS — O26.899 OTHER SPECIFIED PREGNANCY RELATED CONDITIONS, UNSPECIFIED TRIMESTER: ICD-10-CM

## 2022-01-03 PROBLEM — Z78.9 OTHER SPECIFIED HEALTH STATUS: Chronic | Status: ACTIVE | Noted: 2021-07-19

## 2022-01-03 PROCEDURE — G0463: CPT

## 2022-01-03 PROCEDURE — 99214 OFFICE O/P EST MOD 30 MIN: CPT

## 2022-01-03 RX ORDER — LORATADINE 10 MG/1
10 TABLET ORAL
Qty: 14 | Refills: 0 | Status: COMPLETED | COMMUNITY
Start: 2021-07-22 | End: 2022-01-03

## 2022-01-03 RX ORDER — TACROLIMUS 1 MG/G
0.1 OINTMENT TOPICAL
Qty: 1 | Refills: 1 | Status: COMPLETED | COMMUNITY
Start: 2021-07-22 | End: 2022-01-03

## 2022-01-03 RX ORDER — AMOXICILLIN 500 MG/1
500 CAPSULE ORAL 3 TIMES DAILY
Qty: 21 | Refills: 0 | Status: COMPLETED | COMMUNITY
Start: 2021-12-22 | End: 2022-01-03

## 2022-01-03 NOTE — HISTORY OF PRESENT ILLNESS
[3/10] : the patient reports pain that is 3/10 in severity [Fever] : no fever [Chills] : no chills [Nausea] : no nausea [Vomiting] : no vomiting [Diarrhea] : no diarrhea [Vaginal Bleeding] : no vaginal bleeding [Pelvic Pressure] : no pelvic pressure [Dysuria] : no dysuria [Vaginal Discharge] : no vaginal discharge [Constipation] : no constipation [Clean/Dry/Intact] : clean, dry and intact [Erythema] : not erythematous [Swelling] : not swollen [Dehiscence] : not dehisced [Healed] : healed [Discharge] : absent of discharge [Mild] : mild vaginal bleeding [Doing Well] : is doing well [Excellent Pain Control] : has excellent pain control [No Sign of Infection] : is showing no signs of infection [Sutures Removed] : sutures were removed [No [place]] : to avoid sexual intercourse [No Tampons/Suppositories] : against using tampons or vaginal suppositories [Limited Work] : to work with limitations [de-identified] : (+)Breastfeeding with pump [de-identified] : Post Op Exam [de-identified] : (+)Postpartum Cardiology consult referral given - Continue Labetalol/Procardia as prescribed until f/u with Cardiology; PNV daily; RTO 4 weeks for postpartum exam; Post op pain management with compression leggings /  binder reviewed.

## 2022-01-04 DIAGNOSIS — Z48.89 ENCOUNTER FOR OTHER SPECIFIED SURGICAL AFTERCARE: ICD-10-CM

## 2022-01-04 DIAGNOSIS — Z87.59 PERSONAL HISTORY OF OTHER COMPLICATIONS OF PREGNANCY, CHILDBIRTH AND THE PUERPERIUM: ICD-10-CM

## 2022-01-04 LAB — SURGICAL PATHOLOGY STUDY: SIGNIFICANT CHANGE UP

## 2022-01-31 ENCOUNTER — OUTPATIENT (OUTPATIENT)
Dept: OUTPATIENT SERVICES | Facility: HOSPITAL | Age: 27
LOS: 1 days | End: 2022-01-31
Payer: MEDICAID

## 2022-01-31 ENCOUNTER — APPOINTMENT (OUTPATIENT)
Dept: OBGYN | Facility: CLINIC | Age: 27
End: 2022-01-31
Payer: MEDICAID

## 2022-01-31 VITALS
BODY MASS INDEX: 46.07 KG/M2 | SYSTOLIC BLOOD PRESSURE: 123 MMHG | OXYGEN SATURATION: 98 % | TEMPERATURE: 97.7 F | DIASTOLIC BLOOD PRESSURE: 73 MMHG | HEIGHT: 63 IN | WEIGHT: 260 LBS | HEART RATE: 82 BPM

## 2022-01-31 DIAGNOSIS — Z34.00 ENCOUNTER FOR SUPERVISION OF NORMAL FIRST PREGNANCY, UNSPECIFIED TRIMESTER: ICD-10-CM

## 2022-01-31 PROCEDURE — G0463: CPT

## 2022-01-31 PROCEDURE — 99214 OFFICE O/P EST MOD 30 MIN: CPT

## 2022-02-02 NOTE — HISTORY OF PRESENT ILLNESS
[Postpartum Follow Up] : postpartum follow up [Last Pap Date: ___] : Last Pap Date: [unfilled] [Delivery Date: ___] : on [unfilled] [Primary C/S] : delivered by  section [Male] : Delivery History: baby boy [Wt. ___] : weighing [unfilled] [Breastfeeding] : currently nursing [Discharge HCT: ___] : hematocrit level was [unfilled] [Discharge HGB: ___] : hemoglobin level was [unfilled] [Intended Contraception] : Intended Contraception: [Complications:___] : no complications [Rhogam] : Rhogam was not administered [Rubella Vaccine] : Rubella vaccine was not administered [Pertussis Vaccine] : Pertussis vaccine was not administered [BTL] : no tubal ligation [BF with Difficulty] : nursing without difficulty [Resumed Menses] : has not resumed her menses [Resumed [place]] : has not resumed intercourse [Clean/Dry/Intact] : clean, dry and intact [Doing Well] : is doing well [No Sign of Infection] : is showing no signs of infection [Excellent Pain Control] : has excellent pain control [None] : None [FreeTextEntry8] : No complaints / Baby still at NICU-Level 3 nursery [de-identified] : Contraception counseling provided - requesting Nexplanon - referred to [facility]PP clinic for insertion; RTO 6 months for annual gyn exam

## 2022-02-03 DIAGNOSIS — Z87.59 PERSONAL HISTORY OF OTHER COMPLICATIONS OF PREGNANCY, CHILDBIRTH AND THE PUERPERIUM: ICD-10-CM

## 2022-02-03 DIAGNOSIS — Z30.09 ENCOUNTER FOR OTHER GENERAL COUNSELING AND ADVICE ON CONTRACEPTION: ICD-10-CM

## 2022-02-03 DIAGNOSIS — Z48.89 ENCOUNTER FOR OTHER SPECIFIED SURGICAL AFTERCARE: ICD-10-CM

## 2022-02-14 ENCOUNTER — APPOINTMENT (OUTPATIENT)
Dept: CARDIOLOGY | Facility: CLINIC | Age: 27
End: 2022-02-14
Payer: MEDICAID

## 2022-02-17 ENCOUNTER — APPOINTMENT (OUTPATIENT)
Dept: OBGYN | Facility: HOSPITAL | Age: 27
End: 2022-02-17

## 2022-02-17 ENCOUNTER — OUTPATIENT (OUTPATIENT)
Dept: OUTPATIENT SERVICES | Facility: HOSPITAL | Age: 27
LOS: 1 days | End: 2022-02-17

## 2022-02-17 ENCOUNTER — RESULT CHARGE (OUTPATIENT)
Age: 27
End: 2022-02-17

## 2022-02-17 VITALS
BODY MASS INDEX: 45.36 KG/M2 | HEART RATE: 87 BPM | DIASTOLIC BLOOD PRESSURE: 80 MMHG | WEIGHT: 256 LBS | SYSTOLIC BLOOD PRESSURE: 134 MMHG | TEMPERATURE: 98 F | HEIGHT: 63 IN

## 2022-02-17 DIAGNOSIS — Z30.017 ENCOUNTER FOR INITIAL PRESCRIPTION OF IMPLANTABLE SUBDERMAL CONTRACEPTIVE: ICD-10-CM

## 2022-02-17 DIAGNOSIS — Z00.00 ENCOUNTER FOR GENERAL ADULT MEDICAL EXAMINATION W/OUT ABNORMAL FINDINGS: ICD-10-CM

## 2022-03-21 ENCOUNTER — NON-APPOINTMENT (OUTPATIENT)
Age: 27
End: 2022-03-21

## 2022-03-21 ENCOUNTER — APPOINTMENT (OUTPATIENT)
Dept: CARDIOLOGY | Facility: CLINIC | Age: 27
End: 2022-03-21
Payer: MEDICAID

## 2022-03-21 VITALS
DIASTOLIC BLOOD PRESSURE: 79 MMHG | SYSTOLIC BLOOD PRESSURE: 122 MMHG | RESPIRATION RATE: 16 BRPM | WEIGHT: 250 LBS | OXYGEN SATURATION: 95 % | BODY MASS INDEX: 44.3 KG/M2 | HEART RATE: 92 BPM | TEMPERATURE: 98.1 F | HEIGHT: 63 IN

## 2022-03-21 PROCEDURE — 93000 ELECTROCARDIOGRAM COMPLETE: CPT

## 2022-03-21 PROCEDURE — 99204 OFFICE O/P NEW MOD 45 MIN: CPT

## 2022-04-13 ENCOUNTER — OUTPATIENT (OUTPATIENT)
Dept: OUTPATIENT SERVICES | Facility: HOSPITAL | Age: 27
LOS: 1 days | End: 2022-04-13
Payer: MEDICAID

## 2022-04-13 DIAGNOSIS — Z87.59 PERSONAL HISTORY OF OTHER COMPLICATIONS OF PREGNANCY, CHILDBIRTH AND THE PUERPERIUM: ICD-10-CM

## 2022-04-13 PROCEDURE — 93306 TTE W/DOPPLER COMPLETE: CPT

## 2022-05-10 ENCOUNTER — APPOINTMENT (OUTPATIENT)
Dept: CARDIOLOGY | Facility: CLINIC | Age: 27
End: 2022-05-10
Payer: MEDICAID

## 2022-05-10 PROCEDURE — 99213 OFFICE O/P EST LOW 20 MIN: CPT | Mod: 95

## 2022-05-12 ENCOUNTER — APPOINTMENT (OUTPATIENT)
Dept: CARDIOLOGY | Facility: CLINIC | Age: 27
End: 2022-05-12

## 2022-05-17 ENCOUNTER — APPOINTMENT (OUTPATIENT)
Dept: CARDIOLOGY | Facility: CLINIC | Age: 27
End: 2022-05-17
Payer: MEDICAID

## 2022-05-17 PROCEDURE — 99214 OFFICE O/P EST MOD 30 MIN: CPT | Mod: 95

## 2022-06-27 ENCOUNTER — OUTPATIENT (OUTPATIENT)
Dept: OUTPATIENT SERVICES | Facility: HOSPITAL | Age: 27
LOS: 1 days | End: 2022-06-27
Payer: MEDICAID

## 2022-06-27 ENCOUNTER — APPOINTMENT (OUTPATIENT)
Dept: OBGYN | Facility: CLINIC | Age: 27
End: 2022-06-27

## 2022-06-27 VITALS
HEART RATE: 79 BPM | BODY MASS INDEX: 43.23 KG/M2 | DIASTOLIC BLOOD PRESSURE: 85 MMHG | HEIGHT: 63 IN | SYSTOLIC BLOOD PRESSURE: 138 MMHG | WEIGHT: 244 LBS | RESPIRATION RATE: 18 BRPM | TEMPERATURE: 97.7 F | OXYGEN SATURATION: 99 %

## 2022-06-27 DIAGNOSIS — Z00.00 ENCOUNTER FOR GENERAL ADULT MEDICAL EXAMINATION WITHOUT ABNORMAL FINDINGS: ICD-10-CM

## 2022-06-27 DIAGNOSIS — Z48.89 ENCOUNTER FOR OTHER SPECIFIED SURGICAL AFTERCARE: ICD-10-CM

## 2022-06-27 DIAGNOSIS — Z30.017 ENCOUNTER FOR INITIAL PRESCRIPTION OF IMPLANTABLE SUBDERMAL CONTRACEPTIVE: ICD-10-CM

## 2022-06-27 PROCEDURE — 87800 DETECT AGNT MULT DNA DIREC: CPT

## 2022-06-27 PROCEDURE — G0463: CPT

## 2022-06-27 PROCEDURE — 99214 OFFICE O/P EST MOD 30 MIN: CPT | Mod: TH

## 2022-06-27 NOTE — PHYSICAL EXAM
[Chaperone Present] : A chaperone was present in the examining room during all aspects of the physical examination [Appropriately responsive] : appropriately responsive [Alert] : alert [No Acute Distress] : no acute distress [No Lymphadenopathy] : no lymphadenopathy [Soft] : soft [Non-tender] : non-tender [No Lesions] : no lesions [Oriented x3] : oriented x3 [FreeTextEntry2] : Left - arm - palpable Nexplanon long, mobile, non-tender [Examination Of The Breasts] : a normal appearance [No Masses] : no breast masses were palpable [Labia Majora] : normal [Labia Minora] : normal [Normal] : normal

## 2022-06-27 NOTE — HISTORY OF PRESENT ILLNESS
[Patient reported PAP Smear was normal] : Patient reported PAP Smear was normal [Y] : Positive pregnancy history [PapSmeardate] : 7/21/21 [LMPDate] : 3 weeks ago [PGxTotal] : 1 [PGHxFullTerm] : 0 [PGxPremature] : 1 [PGHxAbortions] : 0 [[facility]] : [facility] [PGHxABInduced] : 0 [PGHxABSpont] : 0 [PGHxEctopic] : 0 [PGHxMultBirths] : 0 [Menarche Age: ____] : age at menarche was [unfilled] [FreeTextEntry1] : Irregular since  [No] : Patient does not have concerns regarding sex [Currently Active] : currently active [Men] : men [Vaginal] : vaginal [FreeTextEntry3] : Nexplanon

## 2022-06-28 DIAGNOSIS — Z12.39 ENCOUNTER FOR OTHER SCREENING FOR MALIGNANT NEOPLASM OF BREAST: ICD-10-CM

## 2022-06-28 DIAGNOSIS — Z11.3 ENCOUNTER FOR SCREENING FOR INFECTIONS WITH A PREDOMINANTLY SEXUAL MODE OF TRANSMISSION: ICD-10-CM

## 2022-06-28 DIAGNOSIS — Z01.419 ENCOUNTER FOR GYNECOLOGICAL EXAMINATION (GENERAL) (ROUTINE) WITHOUT ABNORMAL FINDINGS: ICD-10-CM

## 2022-06-28 DIAGNOSIS — Z87.59 PERSONAL HISTORY OF OTHER COMPLICATIONS OF PREGNANCY, CHILDBIRTH AND THE PUERPERIUM: ICD-10-CM

## 2022-06-28 DIAGNOSIS — Z30.09 ENCOUNTER FOR OTHER GENERAL COUNSELING AND ADVICE ON CONTRACEPTION: ICD-10-CM

## 2022-06-28 DIAGNOSIS — I10 ESSENTIAL (PRIMARY) HYPERTENSION: ICD-10-CM

## 2022-06-28 LAB
C TRACH RRNA SPEC QL NAA+PROBE: NOT DETECTED
N GONORRHOEA RRNA SPEC QL NAA+PROBE: NOT DETECTED
SOURCE AMPLIFICATION: NORMAL

## 2022-06-29 LAB
CANDIDA VAG CYTO: NOT DETECTED
G VAGINALIS+PREV SP MTYP VAG QL MICRO: DETECTED
T VAGINALIS VAG QL WET PREP: NOT DETECTED

## 2022-07-18 ENCOUNTER — APPOINTMENT (OUTPATIENT)
Dept: CARDIOLOGY | Facility: CLINIC | Age: 27
End: 2022-07-18

## 2022-07-19 ENCOUNTER — APPOINTMENT (OUTPATIENT)
Dept: CARDIOLOGY | Facility: CLINIC | Age: 27
End: 2022-07-19

## 2022-07-19 PROCEDURE — 99213 OFFICE O/P EST LOW 20 MIN: CPT | Mod: 95

## 2022-07-26 ENCOUNTER — APPOINTMENT (OUTPATIENT)
Dept: DERMATOLOGY | Facility: CLINIC | Age: 27
End: 2022-07-26

## 2022-08-25 ENCOUNTER — APPOINTMENT (OUTPATIENT)
Dept: CARDIOLOGY | Facility: CLINIC | Age: 27
End: 2022-08-25

## 2022-08-30 ENCOUNTER — APPOINTMENT (OUTPATIENT)
Dept: CARDIOLOGY | Facility: CLINIC | Age: 27
End: 2022-08-30

## 2022-08-30 PROCEDURE — 99213 OFFICE O/P EST LOW 20 MIN: CPT | Mod: 95

## 2022-10-18 ENCOUNTER — APPOINTMENT (OUTPATIENT)
Dept: CARDIOLOGY | Facility: CLINIC | Age: 27
End: 2022-10-18

## 2022-10-18 DIAGNOSIS — I10 ESSENTIAL (PRIMARY) HYPERTENSION: ICD-10-CM

## 2022-10-18 PROCEDURE — 99214 OFFICE O/P EST MOD 30 MIN: CPT | Mod: 95

## 2022-10-18 RX ORDER — LABETALOL HYDROCHLORIDE 200 MG/1
200 TABLET, FILM COATED ORAL
Qty: 60 | Refills: 2 | Status: DISCONTINUED | COMMUNITY
Start: 2022-01-04 | End: 2022-10-18

## 2022-11-07 ENCOUNTER — APPOINTMENT (OUTPATIENT)
Dept: CARDIOLOGY | Facility: CLINIC | Age: 27
End: 2022-11-07

## 2023-01-20 NOTE — ED ADULT TRIAGE NOTE - WEIGHT IN LBS
Reached out to inpatient team and requested they reach out to daughter, spoke to daughter on the phone to inform her someone should be getting in touch with her  [phone]

## 2023-03-27 ENCOUNTER — APPOINTMENT (OUTPATIENT)
Dept: CARDIOLOGY | Facility: CLINIC | Age: 28
End: 2023-03-27

## 2023-06-22 NOTE — OB RN PATIENT PROFILE - PATIENT ON (OXYGEN DELIVERY METHOD)
Received phone call from emergency room physician reporting patient currently in the ER with gross hematuria and difficulty urinating  She reports patient recently seen in the [facility] office by PA with presumably advanced prostate cancer, BPH with recent orders for interventional radiology for insertion of nephrostomy tubes, cystoscopy and prostate biopsy to be performed  On evaluation in the emergency department patient noted to have a rise in his creatinine upwards to 2 17, 800 mL noted on bladder scan and urinalysis with microscopy positive for nitrites  Requested for Cazares catheter to be inserted given urinary results of bladder scan PVR and presence of bilateral hydronephrosis  Emergency department acknowledges successful insertion of Cazares catheter with clear loi urine output  Recommend admission for continued observation for gross hematuria and resolution of PAUL  Patient will continue to need outpatient follow-up for IR insertion of nephrostomy tubes and office biopsy for prostate cancer      ALTA Whitman room air

## 2023-07-09 ENCOUNTER — EMERGENCY (EMERGENCY)
Facility: HOSPITAL | Age: 28
LOS: 1 days | Discharge: HOME | End: 2023-07-09
Attending: EMERGENCY MEDICINE
Payer: MEDICAID

## 2023-07-09 VITALS
OXYGEN SATURATION: 98 % | TEMPERATURE: 101 F | HEART RATE: 120 BPM | DIASTOLIC BLOOD PRESSURE: 57 MMHG | RESPIRATION RATE: 19 BRPM | SYSTOLIC BLOOD PRESSURE: 130 MMHG

## 2023-07-09 VITALS
HEIGHT: 63 IN | RESPIRATION RATE: 18 BRPM | TEMPERATURE: 99 F | SYSTOLIC BLOOD PRESSURE: 119 MMHG | DIASTOLIC BLOOD PRESSURE: 69 MMHG | HEART RATE: 133 BPM | OXYGEN SATURATION: 97 % | WEIGHT: 279.99 LBS

## 2023-07-09 LAB
ALBUMIN SERPL ELPH-MCNC: 4.4 G/DL — SIGNIFICANT CHANGE UP (ref 3.3–5)
ALP SERPL-CCNC: 89 U/L — SIGNIFICANT CHANGE UP (ref 40–120)
ALT FLD-CCNC: 28 U/L — SIGNIFICANT CHANGE UP (ref 10–45)
ANION GAP SERPL CALC-SCNC: 15 MMOL/L — SIGNIFICANT CHANGE UP (ref 5–17)
AST SERPL-CCNC: 17 U/L — SIGNIFICANT CHANGE UP (ref 10–40)
BASOPHILS # BLD AUTO: 0.02 K/UL — SIGNIFICANT CHANGE UP (ref 0–0.2)
BASOPHILS NFR BLD AUTO: 0.2 % — SIGNIFICANT CHANGE UP (ref 0–2)
BILIRUB SERPL-MCNC: 0.8 MG/DL — SIGNIFICANT CHANGE UP (ref 0.2–1.2)
BUN SERPL-MCNC: 13 MG/DL — SIGNIFICANT CHANGE UP (ref 7–23)
CALCIUM SERPL-MCNC: 9.3 MG/DL — SIGNIFICANT CHANGE UP (ref 8.4–10.5)
CHLORIDE SERPL-SCNC: 102 MMOL/L — SIGNIFICANT CHANGE UP (ref 96–108)
CO2 SERPL-SCNC: 21 MMOL/L — LOW (ref 22–31)
CREAT SERPL-MCNC: 0.53 MG/DL — SIGNIFICANT CHANGE UP (ref 0.5–1.3)
EGFR: 129 ML/MIN/1.73M2 — SIGNIFICANT CHANGE UP
EOSINOPHIL # BLD AUTO: 0.07 K/UL — SIGNIFICANT CHANGE UP (ref 0–0.5)
EOSINOPHIL NFR BLD AUTO: 0.5 % — SIGNIFICANT CHANGE UP (ref 0–6)
GLUCOSE SERPL-MCNC: 157 MG/DL — HIGH (ref 70–99)
HCG SERPL-ACNC: <2 MIU/ML — SIGNIFICANT CHANGE UP
HCT VFR BLD CALC: 42.2 % — SIGNIFICANT CHANGE UP (ref 34.5–45)
HGB BLD-MCNC: 14.1 G/DL — SIGNIFICANT CHANGE UP (ref 11.5–15.5)
HIV 1 & 2 AB SERPL IA.RAPID: SIGNIFICANT CHANGE UP
IMM GRANULOCYTES NFR BLD AUTO: 0.5 % — SIGNIFICANT CHANGE UP (ref 0–0.9)
LIDOCAIN IGE QN: 32 U/L — SIGNIFICANT CHANGE UP (ref 7–60)
LYMPHOCYTES # BLD AUTO: 0.32 K/UL — LOW (ref 1–3.3)
LYMPHOCYTES # BLD AUTO: 2.5 % — LOW (ref 13–44)
MAGNESIUM SERPL-MCNC: 1.6 MG/DL — SIGNIFICANT CHANGE UP (ref 1.6–2.6)
MCHC RBC-ENTMCNC: 29.9 PG — SIGNIFICANT CHANGE UP (ref 27–34)
MCHC RBC-ENTMCNC: 33.4 GM/DL — SIGNIFICANT CHANGE UP (ref 32–36)
MCV RBC AUTO: 89.4 FL — SIGNIFICANT CHANGE UP (ref 80–100)
MONOCYTES # BLD AUTO: 0.38 K/UL — SIGNIFICANT CHANGE UP (ref 0–0.9)
MONOCYTES NFR BLD AUTO: 2.9 % — SIGNIFICANT CHANGE UP (ref 2–14)
NEUTROPHILS # BLD AUTO: 12.14 K/UL — HIGH (ref 1.8–7.4)
NEUTROPHILS NFR BLD AUTO: 93.4 % — HIGH (ref 43–77)
NRBC # BLD: 0 /100 WBCS — SIGNIFICANT CHANGE UP (ref 0–0)
PHOSPHATE SERPL-MCNC: 2.8 MG/DL — SIGNIFICANT CHANGE UP (ref 2.5–4.5)
PLATELET # BLD AUTO: 346 K/UL — SIGNIFICANT CHANGE UP (ref 150–400)
POTASSIUM SERPL-MCNC: 3.7 MMOL/L — SIGNIFICANT CHANGE UP (ref 3.5–5.3)
POTASSIUM SERPL-SCNC: 3.7 MMOL/L — SIGNIFICANT CHANGE UP (ref 3.5–5.3)
PROT SERPL-MCNC: 7.8 G/DL — SIGNIFICANT CHANGE UP (ref 6–8.3)
RBC # BLD: 4.72 M/UL — SIGNIFICANT CHANGE UP (ref 3.8–5.2)
RBC # FLD: 12.9 % — SIGNIFICANT CHANGE UP (ref 10.3–14.5)
SODIUM SERPL-SCNC: 138 MMOL/L — SIGNIFICANT CHANGE UP (ref 135–145)
WBC # BLD: 13 K/UL — HIGH (ref 3.8–10.5)
WBC # FLD AUTO: 13 K/UL — HIGH (ref 3.8–10.5)

## 2023-07-09 PROCEDURE — 83735 ASSAY OF MAGNESIUM: CPT

## 2023-07-09 PROCEDURE — 99285 EMERGENCY DEPT VISIT HI MDM: CPT

## 2023-07-09 PROCEDURE — 81001 URINALYSIS AUTO W/SCOPE: CPT

## 2023-07-09 PROCEDURE — 84100 ASSAY OF PHOSPHORUS: CPT

## 2023-07-09 PROCEDURE — 36415 COLL VENOUS BLD VENIPUNCTURE: CPT

## 2023-07-09 PROCEDURE — 96374 THER/PROPH/DIAG INJ IV PUSH: CPT

## 2023-07-09 PROCEDURE — 87186 SC STD MICRODIL/AGAR DIL: CPT

## 2023-07-09 PROCEDURE — 87077 CULTURE AEROBIC IDENTIFY: CPT

## 2023-07-09 PROCEDURE — 86703 HIV-1/HIV-2 1 RESULT ANTBDY: CPT

## 2023-07-09 PROCEDURE — 99285 EMERGENCY DEPT VISIT HI MDM: CPT | Mod: 25

## 2023-07-09 PROCEDURE — 76705 ECHO EXAM OF ABDOMEN: CPT

## 2023-07-09 PROCEDURE — 87086 URINE CULTURE/COLONY COUNT: CPT

## 2023-07-09 PROCEDURE — 80053 COMPREHEN METABOLIC PANEL: CPT

## 2023-07-09 PROCEDURE — 76705 ECHO EXAM OF ABDOMEN: CPT | Mod: 26

## 2023-07-09 PROCEDURE — 85025 COMPLETE CBC W/AUTO DIFF WBC: CPT

## 2023-07-09 PROCEDURE — 83690 ASSAY OF LIPASE: CPT

## 2023-07-09 PROCEDURE — 84702 CHORIONIC GONADOTROPIN TEST: CPT

## 2023-07-09 PROCEDURE — 93005 ELECTROCARDIOGRAM TRACING: CPT

## 2023-07-09 RX ORDER — SODIUM CHLORIDE 9 MG/ML
1000 INJECTION, SOLUTION INTRAVENOUS ONCE
Refills: 0 | Status: COMPLETED | OUTPATIENT
Start: 2023-07-09 | End: 2023-07-09

## 2023-07-09 RX ORDER — ACETAMINOPHEN 500 MG
1000 TABLET ORAL ONCE
Refills: 0 | Status: COMPLETED | OUTPATIENT
Start: 2023-07-09 | End: 2023-07-09

## 2023-07-09 RX ORDER — IBUPROFEN 200 MG
400 TABLET ORAL ONCE
Refills: 0 | Status: COMPLETED | OUTPATIENT
Start: 2023-07-09 | End: 2023-07-09

## 2023-07-09 RX ADMIN — SODIUM CHLORIDE 1000 MILLILITER(S): 9 INJECTION, SOLUTION INTRAVENOUS at 11:43

## 2023-07-09 RX ADMIN — SODIUM CHLORIDE 1000 MILLILITER(S): 9 INJECTION, SOLUTION INTRAVENOUS at 07:43

## 2023-07-09 RX ADMIN — Medication 400 MILLIGRAM(S): at 19:47

## 2023-07-09 RX ADMIN — Medication 400 MILLIGRAM(S): at 08:58

## 2023-07-09 RX ADMIN — Medication 1000 MILLIGRAM(S): at 19:02

## 2023-07-09 NOTE — ED PROVIDER NOTE - CLINICAL SUMMARY MEDICAL DECISION MAKING FREE TEXT BOX
sudden onset nausea and vomiting concerning for viral gastroenteritis picture. epigastric abdominal pain radiating to back concerning for a potential pancreatitis. lipase ordered. RUQ US to evaluate for potential cholecystitis. EKG to r/o atypical ACS. Given 1L fluids and tylenol for temp. Tachy to 126 sudden onset nausea and vomiting concerning for viral gastroenteritis picture. epigastric abdominal pain radiating to back concerning for a potential pancreatitis. lipase ordered. RUQ US to evaluate for potential cholecystitis. EKG to r/o atypical ACS. Given 1L fluids and tylenol for temp. Tachy to 126 in ED. sudden onset nausea and vomiting concerning for viral gastroenteritis picture. epigastric abdominal pain radiating to back concerning for a potential pancreatitis. lipase ordered. RUQ US to evaluate for potential cholecystitis. EKG to r/o atypical ACS. Given 1L fluids and tylenol for temp. Tachy to 126 in ED. HCG to r.o pregnancy. sudden onset nausea and vomiting concerning for viral gastroenteritis picture. epigastric abdominal pain radiating to back concerning for a potential pancreatitis. lipase ordered. RUQ US to evaluate for potential cholecystitis. EKG to r/o atypical ACS. Given 2L fluids and tylenol for temp. Tachy to 126 in ED slightly improved to 117 with 2L fluids. WBC elevated at 13. HCG ruled out pregnancy. sudden onset nausea and vomiting concerning for viral gastroenteritis picture. epigastric abdominal pain radiating to back concerning for a potential pancreatitis. lipase ordered. RUQ US to evaluate for potential cholecystitis. EKG to r/o atypical ACS. Given 2L fluids and tylenol for temp. Tachy to 126 in ED slightly improved to 117 with 2L fluids. WBC elevated at 13. HCG ruled out pregnancy. Urine culture sent to evaluate for UTI.

## 2023-07-09 NOTE — ED PROVIDER NOTE - OBJECTIVE STATEMENT
Vomiting and diarrhea since 11:00PM. Has vomited x7 and diarrhea x10-12 times since. States she had alcohol at 1:00PM and does not drink usually. states she has epigastric pain that radiates to her back. states she feels like there is a ball in her epigastric region. states she has chornic diarrhea since 12/21 usually 1-2x a week but this episode is much more frequent and the diarrhea comes on without much warning. States she had a deli sandwhich for dinner last night and that triggers diarrhea for her sometimes. has not been able to hold in anything PO and vomits within an hour of ingesting food. Denies chest pain sob hematuria hematochezia numbness tingling Vomiting and diarrhea since 11:00PM. Has vomited x7 and diarrhea x10-12 times since. States she had alcohol at 1:00PM and does not drink usually. states she has epigastric pain that radiates to her back. states she feels like there is a ball in her epigastric region. states she has chornic diarrhea since 12/21 usually 1-2x a week but this episode is much more frequent and the diarrhea comes on without much warning. States she had a deli sandwhich for dinner last night and that triggers diarrhea for her sometimes. has not been able to hold in anything PO and vomits within an hour of ingesting food. Denies chest pain hematuria hematochezia numbness tingling. states she has some SOB related to anxiety of this situation

## 2023-07-09 NOTE — ED ADULT TRIAGE NOTE - BP NONINVASIVE SYSTOLIC (MM HG)
Melinda is a 14 119

## 2023-07-09 NOTE — ED PROVIDER NOTE - NSFOLLOWUPINSTRUCTIONS_ED_ALL_ED_FT
You came to the hospital with nausea, vomiting, and diarrhea since 11:00 pm last night. You were found to have a high heart rate of 126, suggesting you are dehydrated. We gave you 2 liters of fluid to help rehydrate you. You most likely have a viral gastroenteritis. We did an ultrasound of your right upper belly that did not reveal anything acute to explain your symptoms. You came to the hospital with nausea, vomiting, and diarrhea since 11:00 pm last night. You were found to have a high heart rate of 126, suggesting you are dehydrated. We gave you 2 liters of fluid to help rehydrate you. You most likely have a viral gastroenteritis. We did an ultrasound of your right upper belly that did not reveal anything acute to explain your symptoms. We also looked at your urine You came to the hospital with nausea, vomiting, and diarrhea since 11:00 pm last night. You were found to have a high heart rate of 126, suggesting you are dehydrated. We gave you 2 liters of fluid to help rehydrate you. You most likely have a viral gastroenteritis. We did an ultrasound of your right upper belly that did not reveal anything acute to explain your symptoms. We also looked at your urine    Do not hesitate to return to the emergency department if you develop worsening nausea/vomiting and are unable to hold food down, develop fever/chills, chest pain or difficulty breathing, light headedness/dizziness, blood in vomit. You came to the hospital with nausea, vomiting, and diarrhea since 11:00 pm last night. You were found to have a high heart rate of 126, suggesting you are dehydrated. We gave you 2 liters of fluid to help rehydrate you. You most likely have a viral gastroenteritis, or a viral infection of your stomach, "stomach bug". We did an ultrasound of your right upper belly that did not reveal anything acute to explain your symptoms. We also looked at your urine which was normal.    Do not hesitate to return to the emergency department if you develop worsening nausea/vomiting and are unable to hold food down, develop fever/chills, chest pain or difficulty breathing, light headedness/dizziness, blood in vomit. You came to the hospital with nausea, vomiting, and diarrhea since 11:00 pm last night. You were found to have a high heart rate of 126, suggesting you are dehydrated. We gave you 2 liters of fluid to help rehydrate you. You most likely have a viral gastroenteritis, or a viral infection of your stomach, "stomach bug". We did an ultrasound of your right upper belly that did not reveal anything acute to explain your symptoms. We also looked at your urine which was normal. In the ED we monitored you and you were able to hold food down without vomiting or having diarrhea for a c    Do not hesitate to return to the emergency department if you develop worsening nausea/vomiting and are unable to hold food down, develop fever/chills, chest pain or difficulty breathing, light headedness/dizziness, blood in vomit. You came to the hospital with nausea, vomiting, and diarrhea since 11:00 pm last night. You were found to have a high heart rate of 126, suggesting you are dehydrated. We gave you 2 liters of fluid to help rehydrate you. You most likely have a viral gastroenteritis, or a viral infection of your stomach, "stomach bug". We did an ultrasound of your right upper belly that did not reveal anything acute to explain your symptoms. We also looked at your urine which was normal. In the ED we monitored you and you were able to hold food down without vomiting or having diarrhea for a couple hours. We are discharging you to home and encourage you to follow up with your primary care doctor.    Do not hesitate to return to the emergency department if you develop worsening nausea/vomiting and are unable to hold food down, develop fever/chills, chest pain or difficulty breathing, light headedness/dizziness, blood in vomit.

## 2023-07-09 NOTE — ED ADULT NURSE NOTE - NSFALLUNIVINTERV_ED_ALL_ED
Bed/Stretcher in lowest position, wheels locked, appropriate side rails in place/Call bell, personal items and telephone in reach/Instruct patient to call for assistance before getting out of bed/chair/stretcher/Non-slip footwear applied when patient is off stretcher/Noel to call system/Physically safe environment - no spills, clutter or unnecessary equipment/Purposeful proactive rounding/Room/bathroom lighting operational, light cord in reach
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
## 2023-07-09 NOTE — ED ADULT NURSE REASSESSMENT NOTE - NS ED NURSE REASSESS COMMENT FT1
Pt resting in bed. Passed PO challenge w/ saltines and water. Pt states felt nauseous at first but is feeling better now. Has not thrown up again since this morning, but has had multiple watery bowel movements. Still tachycardic upon finishing 2nd liter of IV fluids.

## 2023-07-09 NOTE — ED ADULT NURSE REASSESSMENT NOTE - NS ED NURSE REASSESS COMMENT FT1
Handoff taken from VIVIAN Chris in gold. Introduced self to pt. Pt resting in bed, bedside commode in room due to urgency of bowel movements. States abd pain 1/10 on pain scale. VSS.

## 2023-07-09 NOTE — ED PROVIDER NOTE - PATIENT PORTAL LINK FT
You can access the FollowMyHealth Patient Portal offered by [facility] by registering at the following website: [email or web address]. By joining [employer]’s FollowMyHealth portal, you will also be able to view your health information using other applications (apps) compatible with our system.

## 2023-07-09 NOTE — ED PROVIDER NOTE - NSICDXPASTMEDICALHX_GEN_ALL_CORE_FT
PAST MEDICAL HISTORY:  HTN (hypertension)     Lactose intolerance     No pertinent past medical history Anxiety  Depression  MIS x 1

## 2023-07-09 NOTE — ED ADULT NURSE NOTE - OBJECTIVE STATEMENT
29 y/o female presents to the ED endorsing nausea, vomiting and diarrhea since 23:00 on 7/9/23. A/Ox4. Ambulatory without assistive devices at baseline. PMH: HTN and Pre-eclampsia (12/22). Patient endorses, "The vomit is the color of what I eat". Patient endorses "green lubricated diarrhea". Patient endorses feelings of anxiety prior to arrival. Patient states, "I had a panic attack". Patient denies fever, cough, chest pain, palpitations and dyspnea. Patient on CM, Sinus Tachycardia. Safety and comfort provided.

## 2023-07-09 NOTE — ED PROVIDER NOTE - ATTENDING CONTRIBUTION TO CARE
attending Bisi: 28yF p/w multiple episodes of vomiting and diarrhea since last night. Assoc with abd cramping, mostly in epigastric area. Started after eating a deli sandwich. Exam as above. Likely viral process, will also obtain UA and RUQ US to eval gallbladder given epigastric discomfort, labs, symptomatic treatment/IVF, reassess

## 2023-07-11 NOTE — ED POST DISCHARGE NOTE - DETAILS
7/11 [facility] for call back to assess patient's symptoms. - Elin Szymanski PA-C 7/12/23: results d/w pt, pt reports increased urinary frequency over the past week, would tx for UTI, nitrofurantoin sent to pt preferred pharmacy. -Cristi Redmond PA-C

## 2023-07-12 RX ORDER — NITROFURANTOIN MACROCRYSTAL 50 MG
1 CAPSULE ORAL
Qty: 10 | Refills: 0
Start: 2023-07-12 | End: 2023-07-16

## 2023-07-28 ENCOUNTER — APPOINTMENT (OUTPATIENT)
Dept: OBGYN | Facility: CLINIC | Age: 28
End: 2023-07-28
Payer: MEDICAID

## 2023-07-28 ENCOUNTER — OUTPATIENT (OUTPATIENT)
Dept: OUTPATIENT SERVICES | Facility: HOSPITAL | Age: 28
LOS: 1 days | End: 2023-07-28
Payer: MEDICAID

## 2023-07-28 VITALS
TEMPERATURE: 98.1 F | SYSTOLIC BLOOD PRESSURE: 123 MMHG | HEIGHT: 63 IN | RESPIRATION RATE: 18 BRPM | DIASTOLIC BLOOD PRESSURE: 78 MMHG | OXYGEN SATURATION: 97 % | HEART RATE: 108 BPM | WEIGHT: 276 LBS | BODY MASS INDEX: 48.9 KG/M2

## 2023-07-28 DIAGNOSIS — Z01.419 ENCOUNTER FOR GYNECOLOGICAL EXAMINATION (GENERAL) (ROUTINE) W/OUT ABNORMAL FINDINGS: ICD-10-CM

## 2023-07-28 DIAGNOSIS — Z00.00 ENCOUNTER FOR GENERAL ADULT MEDICAL EXAMINATION WITHOUT ABNORMAL FINDINGS: ICD-10-CM

## 2023-07-28 DIAGNOSIS — Z87.59 PERSONAL HISTORY OF OTHER COMPLICATIONS OF PREGNANCY, CHILDBIRTH AND THE PUERPERIUM: ICD-10-CM

## 2023-07-28 DIAGNOSIS — Z97.5 PRESENCE OF (INTRAUTERINE) CONTRACEPTIVE DEVICE: ICD-10-CM

## 2023-07-28 DIAGNOSIS — Z12.39 ENCOUNTER FOR OTHER SCREENING FOR MALIGNANT NEOPLASM OF BREAST: ICD-10-CM

## 2023-07-28 DIAGNOSIS — Z13.6 ENCOUNTER FOR SCREENING FOR CARDIOVASCULAR DISORDERS: ICD-10-CM

## 2023-07-28 DIAGNOSIS — Z30.09 ENCOUNTER FOR OTHER GENERAL COUNSELING AND ADVICE ON CONTRACEPTION: ICD-10-CM

## 2023-07-28 DIAGNOSIS — Z11.3 ENCOUNTER FOR SCREENING FOR INFECTIONS WITH A PREDOMINANTLY SEXUAL MODE OF TRANSMISSION: ICD-10-CM

## 2023-07-28 DIAGNOSIS — N76.1 SUBACUTE AND CHRONIC VAGINITIS: ICD-10-CM

## 2023-07-28 PROBLEM — I10 ESSENTIAL (PRIMARY) HYPERTENSION: Chronic | Status: ACTIVE | Noted: 2023-07-09

## 2023-07-28 PROBLEM — E73.9 LACTOSE INTOLERANCE, UNSPECIFIED: Chronic | Status: ACTIVE | Noted: 2023-07-09

## 2023-07-28 PROCEDURE — G0463: CPT

## 2023-07-28 PROCEDURE — 87800 DETECT AGNT MULT DNA DIREC: CPT

## 2023-07-28 PROCEDURE — 99214 OFFICE O/P EST MOD 30 MIN: CPT | Mod: TH

## 2023-07-28 RX ORDER — ETONOGESTREL 68 MG/1
68 IMPLANT SUBCUTANEOUS
Refills: 0 | Status: ACTIVE | COMMUNITY
Start: 2022-02-17

## 2023-07-28 RX ORDER — FOLIC ACID/MULTIVIT,IRON,MINER 0.4MG-18MG
200 TABLET ORAL
Qty: 30 | Refills: 11 | Status: COMPLETED | COMMUNITY
Start: 2021-07-21 | End: 2023-07-28

## 2023-07-28 RX ORDER — NIFEDIPINE 60 MG/1
60 TABLET, EXTENDED RELEASE ORAL DAILY
Qty: 90 | Refills: 3 | Status: ACTIVE | COMMUNITY
Start: 2022-01-04

## 2023-07-28 RX ORDER — METRONIDAZOLE 7.5 MG/G
0.75 GEL VAGINAL
Qty: 1 | Refills: 1 | Status: COMPLETED | COMMUNITY
Start: 2022-06-29 | End: 2023-07-28

## 2023-07-28 NOTE — HISTORY OF PRESENT ILLNESS
[FreeTextEntry1] : Annual Gyn exam \par \par Complaining of abnormal vaginal discharge\par \par LMP - stopped secondary to Nexplanon - inserted (09/2022)\par \par Last Pap(07/21/21) Negative\par \par Hx/o Pre-eclampsia, managed by Cardiology, subsequently by PCP, currently on 2 medications for HTN; (+032 lbs in one year, counseled pt on importance of weight loss, diet, exercise for HTN and associated complications with Obesity.  Patient expressed understanding, currently considering weight loss regimen with weight loss drug recommended by PCP.

## 2023-08-01 DIAGNOSIS — Z87.59 PERSONAL HISTORY OF OTHER COMPLICATIONS OF PREGNANCY, CHILDBIRTH AND THE PUERPERIUM: ICD-10-CM

## 2023-08-01 DIAGNOSIS — Z30.09 ENCOUNTER FOR OTHER GENERAL COUNSELING AND ADVICE ON CONTRACEPTION: ICD-10-CM

## 2023-08-01 DIAGNOSIS — N76.1 SUBACUTE AND CHRONIC VAGINITIS: ICD-10-CM

## 2023-08-01 DIAGNOSIS — Z12.39 ENCOUNTER FOR OTHER SCREENING FOR MALIGNANT NEOPLASM OF BREAST: ICD-10-CM

## 2023-08-01 DIAGNOSIS — Z11.3 ENCOUNTER FOR SCREENING FOR INFECTIONS WITH A PREDOMINANTLY SEXUAL MODE OF TRANSMISSION: ICD-10-CM

## 2023-08-01 DIAGNOSIS — Z97.5 PRESENCE OF (INTRAUTERINE) CONTRACEPTIVE DEVICE: ICD-10-CM

## 2023-08-01 DIAGNOSIS — Z01.419 ENCOUNTER FOR GYNECOLOGICAL EXAMINATION (GENERAL) (ROUTINE) WITHOUT ABNORMAL FINDINGS: ICD-10-CM

## 2023-08-01 LAB
C TRACH RRNA SPEC QL NAA+PROBE: NOT DETECTED
CANDIDA VAG CYTO: NOT DETECTED
G VAGINALIS+PREV SP MTYP VAG QL MICRO: NOT DETECTED
N GONORRHOEA RRNA SPEC QL NAA+PROBE: NOT DETECTED
SOURCE AMPLIFICATION: NORMAL
T VAGINALIS VAG QL WET PREP: NOT DETECTED

## 2024-03-26 NOTE — ED ADULT NURSE NOTE - MODE OF DISCHARGE
Nurses Note -- 4 Eyes      3/26/2024   5:25 AM      Skin assessed during: Daily Assessment      [x] No Altered Skin Integrity Present    [x]Prevention Measures Documented      [] Yes- Altered Skin Integrity Present or Discovered   [] LDA Added if Not in Epic (Describe Wound)   [] New Altered Skin Integrity was Present on Admit and Documented in LDA   [] Wound Image Taken    Wound Care Consulted? No    Attending Nurse:  Wendy Forte RN/Staff Member:   Elias RHODES           Ambulatory

## 2025-03-03 NOTE — ED ADULT NURSE NOTE - NS ED NURSE RECORD ANOTHER VITAL SIGN
[facility] Dermatology  Paulo Mccallum MD  [phone]      Aleja Silverio  2001    23 y.o. female     Date of Visit: 3/3/2025    Chief Complaint:     Chief Complaint   Patient presents with    Dermatitis     Face, neck, areolas, arms, legs. Pruritic.     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  She returns today to follow-up for chronic atopic dermatitis.  She reports that it has been flaring consistently over the last year.  Affected areas include the face, neck, areolas, arms and legs.  The itching disrupts her sleep.  It seems to be worse with exercise or with heat.  She has used topical steroids and more recently topical pimecrolimus cream with minimal improvement.  They help with the pruritus but do not clear her skin.    She previously experienced itching and irritation with the application of tacrolimus 0.1% ointment.     2.  She also reports a persistent wart on the right thumb.    Getting  in Nov.      Review of Systems:  Gen: Feels well, good sense of health.      Past Medical History, Family History, Surgical History, Medications and Allergies reviewed.    History reviewed. No pertinent past medical history.  Past Surgical History:   Procedure Laterality Date    APPENDECTOMY         No Known Allergies  Outpatient Medications Marked as Taking for the 3/3/25 encounter (Office Visit) with Paulo Mccallum MD   Medication Sig Dispense Refill    dupilumab (DUPIXENT) 300 MG/2ML SOAJ injection Inject 600 mg SC once and then 300 mg SC every 2 weeks for atopic dermatitis. 4 mL 3    FLUoxetine (PROZAC) 10 MG capsule Take 1 capsule by mouth daily      Adapalene (DIFFERIN) 0.3 % GEL Apply a pea sized amount to the face at bedtime. 45 g 4    levonorgestrel (MIRENA, 52 MG,) IUD 52 mg 1 Intra Uterine Device by IntraUTERine route once      triamcinolone (KENALOG) 0.1 
Yes